# Patient Record
Sex: MALE | Race: WHITE | NOT HISPANIC OR LATINO | Employment: FULL TIME | URBAN - METROPOLITAN AREA
[De-identification: names, ages, dates, MRNs, and addresses within clinical notes are randomized per-mention and may not be internally consistent; named-entity substitution may affect disease eponyms.]

---

## 2017-01-16 ENCOUNTER — OFFICE VISIT (OUTPATIENT)
Dept: ORTHOPEDICS | Facility: CLINIC | Age: 57
End: 2017-01-16
Payer: COMMERCIAL

## 2017-01-16 VITALS — SYSTOLIC BLOOD PRESSURE: 144 MMHG | HEART RATE: 87 BPM | DIASTOLIC BLOOD PRESSURE: 79 MMHG

## 2017-01-16 DIAGNOSIS — M12.812 LEFT ROTATOR CUFF TEAR ARTHROPATHY: Primary | ICD-10-CM

## 2017-01-16 DIAGNOSIS — M50.30 DISC DISEASE, DEGENERATIVE, CERVICAL: ICD-10-CM

## 2017-01-16 DIAGNOSIS — M75.102 LEFT ROTATOR CUFF TEAR ARTHROPATHY: Primary | ICD-10-CM

## 2017-01-16 PROCEDURE — 99213 OFFICE O/P EST LOW 20 MIN: CPT | Mod: 25 | Performed by: PREVENTIVE MEDICINE

## 2017-01-16 PROCEDURE — 20610 DRAIN/INJ JOINT/BURSA W/O US: CPT | Mod: LT | Performed by: PREVENTIVE MEDICINE

## 2017-01-16 RX ORDER — METHYLPREDNISOLONE 4 MG
TABLET, DOSE PACK ORAL
Qty: 21 TABLET | Refills: 0 | Status: SHIPPED | OUTPATIENT
Start: 2017-01-16 | End: 2019-12-05

## 2017-01-16 RX ORDER — MELOXICAM 15 MG/1
15 TABLET ORAL DAILY
Qty: 30 TABLET | Refills: 1 | Status: SHIPPED | OUTPATIENT
Start: 2017-01-16 | End: 2017-03-06

## 2017-01-16 ASSESSMENT — PAIN SCALES - GENERAL: PAINLEVEL: SEVERE PAIN (6)

## 2017-01-16 NOTE — PROGRESS NOTES
HISTORY OF PRESENT ILLNESS  Mr. Valerio is a pleasant 56 year old year old male who presents to clinic today with ongoing neck and left shoulder pain  Previous surgery biceps tenodesis left shoulder with Dr Payton last year  History of one cervical MARIANA which Did not help at all about 7 months ago. Wants to see a surgeon  He had a previous CT of cervical spine showing DDD  Location: neck and left shoulder  Quality:  achy pain/sharp    Severity: 9/10 at worst    Duration: year  Timing: occurs daily  Context: occurs while working (works as a  and   Modifying factors:  resting and non-use makes it better, movement and use makes it worse  Associated signs & symptoms: radiation and numbness into both arms    Additional history: as documented    MEDICAL HISTORY  Patient Active Problem List   Diagnosis     Abnormal glucose     Morbid obesity (H)     Fatigue       Current Outpatient Prescriptions   Medication Sig Dispense Refill     tiZANidine (ZANAFLEX) 4 MG tablet Take 1-2 tablets (4-8 mg) by mouth nightly as needed 30 tablet 1     meloxicam (MOBIC) 15 MG tablet Take 1 tablet (15 mg) by mouth daily 30 tablet 1     oxyCODONE (ROXICODONE) 5 MG immediate release tablet Take 1-2 tablets (5-10 mg) by mouth every 3 hours as needed for pain or other (Moderate to Severe) 80 tablet 0     senna-docusate (SENOKOT-S;PERICOLACE) 8.6-50 MG per tablet Take 1-2 tablets by mouth 2 times daily Take while on oral narcotics to prevent or treat constipation. 30 tablet 0     hydrOXYzine (ATARAX) 10 MG tablet Take 1 tablet (10 mg) by mouth every 6 hours as needed for itching (pain or muscle spasm) 30 tablet 0     VITAMIN D, CHOLECALCIFEROL, PO Take 1,000 Units by mouth daily       ALPRAZolam (XANAX) 1 MG tablet Take 1 tablet (1 mg) by mouth 3 times daily as needed for anxiety 1 tablet 0     venlafaxine (EFFEXOR) 75 MG tablet Take 75 mg by mouth 3 times daily       metFORMIN (GLUCOPHAGE) 500 MG tablet Take 500 mg by mouth  2 times daily (with meals) Hold day of surgery       furosemide (LASIX) 20 MG tablet Take 40 mg by mouth daily       potassium chloride SA (K-DUR,KLOR-CON M) 10 MEQ tablet Take 10 mEq by mouth daily       amLODIPine (NORVASC) 10 MG tablet Take 10 mg by mouth every evening        simvastatin (ZOCOR) 40 MG tablet Take 20 mg by mouth At Bedtime       aspirin 81 MG tablet Take by mouth daily       lisinopril-hydrochlorothiazide (PRINZIDE,ZESTORETIC) 20-25 MG per tablet Take 1 tablet by mouth daily Holding am of surgery       UNABLE TO FIND MEDICATION NAME: Super Enzymes Vitamin       Multiple Vitamins-Minerals (MULTIVITAMIN OR) Take 1 tablet by mouth daily       TESTOSTERONE 2MG Inject as directed every 14 days       Omega-3 Fatty Acids (OMEGA-3 FISH OIL PO) Take 1 g by mouth daily         No Known Allergies    Family History   Problem Relation Age of Onset     DIABETES Mother      Type II     HEART DISEASE Father      Quad bypass       Additional medical/Social/Surgical histories reviewed in [facility] and updated as appropriate.     REVIEW OF SYSTEMS (1/16/2017)  10 point ROS of systems including Constitutional, Eyes, Respiratory, Cardiovascular, Gastroenterology, Genitourinary, Integumentary, Musculoskeletal, Psychiatric were all negative except for pertinent positives noted in my HPI.     PHYSICAL EXAM  Filed Vitals:    01/16/17 1426   BP: 144/79   Pulse: 87     Vital Signs: /79 mmHg  Pulse 87 Patient declined being weighed. There is no weight on file to calculate BMI.    General  - normal appearance, in no obvious distress  CV  - normal radial pulse  Pulm  - normal respiratory pattern, non-labored  Musculoskeletal - left shoulder and neck  - inspection: normal bone and joint alignment, no obvious deformity, no scapular winging, no AC step-off  - palpation: mildly tender RC insertion, normal clavicle, non-tender AC, pain bilateral cervical muscles, upper trap on left  - ROM:  painful and limited flexion and ER at  end range, limited IR of left shoulder, and with neck, limited flexion/extension/side bending and bilateral positive spurlings   - strength: 5/5  strength, 5/5 in all shoulder planes  - special tests:  (-) Speed's  (+) Neer  (+) Hawkin's  (+) Kieran's  (-) [place]'s  (-) apprehension  (-) subscap lift-off  Neuro  - no sensory or motor deficit, grossly normal coordination, normal muscle tone  Skin  - no ecchymosis, erythema, warmth, or induration, no obvious rash  Psych  - interactive, appropriate, normal mood and affect      ASSESSMENT & PLAN  55 yo male presents with left shoulder pain and neck pain due to DDD  -given left shoulder inection, will followup with Dr Payton who performed his previous biceps tenodesis  -referral to Dr Sanchez for cervical dDD options  -rx for medrol, tizanadine, mobic PRN  -RTC in a few weeks after seeing referral Rosalind Cottrell MD, CAQSM    PROCEDURE: SHOULDER GH joint injection   NDC 2381-2067-52 kenalog     The patient was apprised of the risks and the benefits of the procedure and consented and a written consent was signed by the patient.   The patient s shoulder was sterilely prepped with Betadine.   40 mg of triamcinolone suspension was drawn up into a 5 mL syringe with 2 mL of 1% lidocaine   The patient was injected with a 1.5-inch 25-gauge needle at posterior gleno-humeral joint in the subacromial space  There were no complications. The patient tolerated the procedure well. There was minimal bleeding.   The patient was instructed to ice the shoulder upon leaving clinic and refrain from overuse over the next 3 days.   The patient was instructed to go to the emergency room with any usual pain, swelling, or redness occurred in the injected area.   The patient was given a followup appointment to evaluate response to the injection to their increased range of motion and reduction of pain.    followup PRN  Dr Chace Cottrell

## 2017-01-19 ENCOUNTER — OFFICE VISIT (OUTPATIENT)
Dept: ORTHOPEDICS | Facility: CLINIC | Age: 57
End: 2017-01-19
Payer: COMMERCIAL

## 2017-01-19 DIAGNOSIS — M54.10 RADICULAR PAIN: Primary | ICD-10-CM

## 2017-01-19 PROCEDURE — 99213 OFFICE O/P EST LOW 20 MIN: CPT | Performed by: ORTHOPAEDIC SURGERY

## 2017-01-19 NOTE — NURSING NOTE
"Osbaldo Valerio's goals for this visit include:   Chief Complaint   Patient presents with     Consult     new right and left shoulder per Dr. Cottrell       He requests these members of his care team be copied on today's visit information: pcp    PCP: Mick Varela    Referring Provider:  No referring provider defined for this encounter.    Chief Complaint   Patient presents with     Consult     new right and left shoulder per Dr. Cottrell       Initial There were no vitals taken for this visit. Estimated body mass index is 39.36 kg/(m^2) as calculated from the following:    Height as of 3/8/16: 1.803 m (5' 11\").    Weight as of 3/17/16: 127.96 kg (282 lb 1.6 oz).  BP completed using cuff size: large    Do you need any medication refills at today's visit? No    Yesenia Heath CMA ([facility])      "

## 2017-01-24 ENCOUNTER — TRANSFERRED RECORDS (OUTPATIENT)
Dept: HEALTH INFORMATION MANAGEMENT | Facility: CLINIC | Age: 57
End: 2017-01-24

## 2017-03-09 DIAGNOSIS — M75.102 LEFT ROTATOR CUFF TEAR ARTHROPATHY: Primary | ICD-10-CM

## 2017-03-09 DIAGNOSIS — M12.812 LEFT ROTATOR CUFF TEAR ARTHROPATHY: Primary | ICD-10-CM

## 2017-03-09 RX ORDER — DICLOFENAC SODIUM 75 MG/1
75 TABLET, DELAYED RELEASE ORAL 2 TIMES DAILY PRN
Qty: 40 TABLET | Refills: 1 | Status: SHIPPED | OUTPATIENT
Start: 2017-03-09 | End: 2017-04-26

## 2017-04-17 ENCOUNTER — OFFICE VISIT (OUTPATIENT)
Dept: ORTHOPEDICS | Facility: CLINIC | Age: 57
End: 2017-04-17
Payer: COMMERCIAL

## 2017-04-17 ENCOUNTER — RADIANT APPOINTMENT (OUTPATIENT)
Dept: GENERAL RADIOLOGY | Facility: CLINIC | Age: 57
End: 2017-04-17
Attending: PREVENTIVE MEDICINE
Payer: COMMERCIAL

## 2017-04-17 VITALS — HEART RATE: 81 BPM | DIASTOLIC BLOOD PRESSURE: 73 MMHG | SYSTOLIC BLOOD PRESSURE: 138 MMHG

## 2017-04-17 DIAGNOSIS — M54.5 LOW BACK PAIN, UNSPECIFIED BACK PAIN LATERALITY, UNSPECIFIED CHRONICITY, WITH SCIATICA PRESENCE UNSPECIFIED: Primary | ICD-10-CM

## 2017-04-17 DIAGNOSIS — M54.5 LOW BACK PAIN, UNSPECIFIED BACK PAIN LATERALITY, UNSPECIFIED CHRONICITY, WITH SCIATICA PRESENCE UNSPECIFIED: ICD-10-CM

## 2017-04-17 DIAGNOSIS — M51.16 LUMBAR DISC HERNIATION WITH RADICULOPATHY: ICD-10-CM

## 2017-04-17 DIAGNOSIS — Z98.890 HISTORY OF LUMBOSACRAL SPINE SURGERY: ICD-10-CM

## 2017-04-17 DIAGNOSIS — M12.812 LEFT ROTATOR CUFF TEAR ARTHROPATHY: ICD-10-CM

## 2017-04-17 DIAGNOSIS — M75.102 LEFT ROTATOR CUFF TEAR ARTHROPATHY: ICD-10-CM

## 2017-04-17 PROCEDURE — 99214 OFFICE O/P EST MOD 30 MIN: CPT | Performed by: PREVENTIVE MEDICINE

## 2017-04-17 PROCEDURE — 72100 X-RAY EXAM L-S SPINE 2/3 VWS: CPT | Performed by: RADIOLOGY

## 2017-04-17 ASSESSMENT — PAIN SCALES - GENERAL: PAINLEVEL: SEVERE PAIN (6)

## 2017-04-17 NOTE — PROGRESS NOTES
HISTORY OF PRESENT ILLNESS  Mr. Valerio is a pleasant 56 year old year old male who presents to clinic today with right hip pain  Osbaldo explains that he has had some more pain over the past week. Has history of lumbar fusion surgery in 2001.    Location: right hip  Quality:  achy pain    Severity: 7/10 at worst    Duration: a few weeks  Timing: occurs intermittently towards the end of his day  Context: occurs while standing and walking a lot, and at night when lies flat  Modifying factors:  resting and non-use makes it better, movement and use makes it worse  Associated signs & symptoms: radiation of pain into right leg and tingling  Previous similar pain: yes  Works as a hog farmer  Additional history: as documented    MEDICAL HISTORY  Patient Active Problem List   Diagnosis     Abnormal glucose     Morbid obesity (H)     Fatigue       Current Outpatient Prescriptions   Medication Sig Dispense Refill     diclofenac (VOLTAREN) 75 MG EC tablet Take 1 tablet (75 mg) by mouth 2 times daily as needed 40 tablet 1     meloxicam (MOBIC) 15 MG tablet TAKE ONE TABLET BY MOUTH ONCE DAILY 30 tablet 1     tiZANidine (ZANAFLEX) 4 MG tablet Take 1-2 tablets (4-8 mg) by mouth nightly as needed 30 tablet 1     methylPREDNISolone (MEDROL DOSEPAK) 4 MG tablet Follow package instructions 21 tablet 0     oxyCODONE (ROXICODONE) 5 MG immediate release tablet Take 1-2 tablets (5-10 mg) by mouth every 3 hours as needed for pain or other (Moderate to Severe) 80 tablet 0     senna-docusate (SENOKOT-S;PERICOLACE) 8.6-50 MG per tablet Take 1-2 tablets by mouth 2 times daily Take while on oral narcotics to prevent or treat constipation. 30 tablet 0     hydrOXYzine (ATARAX) 10 MG tablet Take 1 tablet (10 mg) by mouth every 6 hours as needed for itching (pain or muscle spasm) 30 tablet 0     VITAMIN D, CHOLECALCIFEROL, PO Take 1,000 Units by mouth daily       ALPRAZolam (XANAX) 1 MG tablet Take 1 tablet (1 mg) by mouth 3 times daily as needed  for anxiety 1 tablet 0     venlafaxine (EFFEXOR) 75 MG tablet Take 75 mg by mouth 3 times daily       metFORMIN (GLUCOPHAGE) 500 MG tablet Take 500 mg by mouth 2 times daily (with meals) Hold day of surgery       furosemide (LASIX) 20 MG tablet Take 40 mg by mouth daily       potassium chloride SA (K-DUR,KLOR-CON M) 10 MEQ tablet Take 10 mEq by mouth daily       amLODIPine (NORVASC) 10 MG tablet Take 10 mg by mouth every evening        simvastatin (ZOCOR) 40 MG tablet Take 20 mg by mouth At Bedtime       aspirin 81 MG tablet Take by mouth daily       lisinopril-hydrochlorothiazide (PRINZIDE,ZESTORETIC) 20-25 MG per tablet Take 1 tablet by mouth daily Holding am of surgery       UNABLE TO FIND MEDICATION NAME: Super Enzymes Vitamin       Multiple Vitamins-Minerals (MULTIVITAMIN OR) Take 1 tablet by mouth daily       TESTOSTERONE 2MG Inject as directed every 14 days       Omega-3 Fatty Acids (OMEGA-3 FISH OIL PO) Take 1 g by mouth daily         No Known Allergies    Family History   Problem Relation Age of Onset     DIABETES Mother      Type II     HEART DISEASE Father      Quad bypass       Additional medical/Social/Surgical histories reviewed in [facility] and updated as appropriate.     REVIEW OF SYSTEMS (4/17/2017)  10 point ROS of systems including Constitutional, Eyes, Respiratory, Cardiovascular, Gastroenterology, Genitourinary, Integumentary, Musculoskeletal, Psychiatric were all negative except for pertinent positives noted in my HPI.     Vital Signs: /73  Pulse 81 Patient declined being weighed. There is no height or weight on file to calculate BMI.    General  - normal appearance, in no obvious distress, obese  CV  - normal peripheral perfusion  Pulm  - normal respiratory pattern, non-labored  Musculoskeletal - lumbar spine and right hip  - stance: normal gait without limp, no obvious leg length discrepancy, normal heel and toe walk  Has no pain in right hip with internal or external rotation  -  inspection: normal bone and joint alignment, no obvious scoliosis  - palpation: no paravertebral or bony tenderness  - ROM: flexion exacerbates pain in low back and right hip, abnormal extension- stiff and sore, sidebending, rotation  - strength: lower extremities 5/5 in all planes  - special tests:  (+) straight leg raise- right  (+) slump test-right  Neuro  - patellar and Achilles DTRs 2+ bilaterally, right lower extremity sensory deficit throughout L3 distribution, grossly normal coordination, normal muscle tone  Skin  - no ecchymosis, erythema, warmth, or induration, no obvious rash  Psych  - interactive, appropriate, normal mood and affect  ASSESSMENT & PLAN  57 yo male with right hip and lumbar pain due to lumbar herniated disc  -xrays reviewed: has disc space narrowing at L3 above the fusion  Ordered a lumbar CT scan for possible injection MARIANA ordered  F/u after CT (OK TO CALL FOR RESULTS WITH PLAN FOR MARIANA)    Chace Cottrell MD, CAQSM

## 2017-04-17 NOTE — MR AVS SNAPSHOT
After Visit Summary   4/17/2017    Osbaldo Valerio    MRN: [id number]           Patient Information     Date Of Birth          1960        Visit Information        Provider Department      4/17/2017 3:20 PM Chace Cottrell MD [facility]        Today's Diagnoses     Low back pain, unspecified back pain laterality, unspecified chronicity, with sciatica presence unspecified    -  1    Lumbar disc herniation with radiculopathy        History of lumbosacral spine surgery        Left rotator cuff tear arthropathy          Care Instructions    Thanks for coming today.  Ortho/Sports Medicine Clinic  [address] [place] [address]    To schedule future appointments in Ortho Clinic, you may call [phone].    To schedule ordered imaging by your Provider: Call [facility] at [phone]    [employer] available online at:   [email or web address]    Please call if any further questions or concerns [phone] and ask for the Orthopedic Department. Clinic hours 8 am to 5 pm.    Return to clinic if symptoms worsen.        Follow-ups after your visit        Your next 10 appointments already scheduled     Apr 17, 2017  4:35 PM CDT   XR LUMBAR SPINE 2/3 VIEWS with MGXR2   [facility])    [address] [facility] [address] [phone]           Please bring a list of your current medicines to your exam. (Include vitamins, minerals and over-thecounter medicines.) Leave your valuables at home.  Tell your doctor if there is a chance you may be pregnant.  You do not need to do anything special for this exam.            Apr 18, 2017  2:30 PM CDT   CT LUMBAR SPINE W/O CONTRAST with MGCT1   [facility] ([facility])    [address] [facility] [address] [phone]           Please bring any scans or X-rays taken at other hospitals, if similar tests were  done. Also bring a list of your medicines, including vitamins, minerals and over-the-counter drugs. It is safest to leave personal items at home.  Be sure to tell your doctor:   If you have any allergies.   If there s any chance you are pregnant.   If you are breastfeeding.   If you have any special needs.  You do not need to do anything special to prepare.  Please wear loose clothing, such as a sweat suit or jogging clothes. Avoid snaps, zippers and other metal. We may ask you to undress and put on a hospital gown.              Future tests that were ordered for you today     Open Future Orders        Priority Expected Expires Ordered    CT Lumbar Spine w/o Contrast Routine  4/17/2018 4/17/2017            Who to contact     If you have questions or need follow up information about today's clinic visit or your schedule please contact [facility] directly at [phone].  Normal or non-critical lab and imaging results will be communicated to you by [employer], letter or phone within 4 business days after the clinic has received the results. If you do not hear from us within 7 days, please contact the clinic through [employer] or phone. If you have a critical or abnormal lab result, we will notify you by phone as soon as possible.  Submit refill requests through [email or web address] or call your pharmacy and they will forward the refill request to us. Please allow 3 business days for your refill to be completed.          Additional Information About Your Visit        [email or web address] Information     [email or web address] is an electronic gateway that provides easy, online access to your medical records. With [email or web address], you can request a clinic appointment, read your test results, renew a prescription or communicate with your care team.     To sign up for [email or web address] visit the website at [email or web address]   You will be asked to enter the access code listed below, as well as some personal information. Please follow the directions to  create your username and password.     Your access code is: [email or web address]  Expires: 2017  4:02 PM     Your access code will  in 90 days. If you need help or a new code, please contact your [facility] or call [phone] for assistance.        Care EveryWhere ID     This is your Care EveryWhere ID. This could be used by other organizations to access your [place] medical records  [id number]        Your Vitals Were     Pulse                   81            Blood Pressure from Last 3 Encounters:   17 138/73   17 144/79   16 130/77    Weight from Last 3 Encounters:   16 128 kg (282 lb 1.6 oz)   16 121 kg (266 lb 11.2 oz)   06/15/15 117.9 kg (260 lb 0.2 oz)                 Where to get your medicines      These medications were sent to [id number] - [place], [place] - [address]  [address] [facility] [address]    Hours:  test Rx sent successfully 02  KR Phone:  [phone]     tiZANidine 4 MG tablet          Primary Care Provider Office Phone # Fax #    Mick Varela PA-C [phone]       [facility] [address]  [facility] [address]        Thank you!     Thank you for choosing [facility]  for your care. Our goal is always to provide you with excellent care. Hearing back from our patients is one way we can continue to improve our services. Please take a few minutes to complete the written survey that you may receive in the mail after your visit with us. Thank you!             Your Updated Medication List - Protect others around you: Learn how to safely use, store and throw away your medicines at www.disposemymeds.org.          This list is accurate as of: 17  4:04 PM.  Always use your most recent med list.                   Brand Name Dispense Instructions for use    ALPRAZolam 1 MG tablet    XANAX    1 tablet    Take 1 tablet (1 mg) by mouth 3 times daily as  needed for anxiety       amLODIPine 10 MG tablet    NORVASC     Take 10 mg by mouth every evening       aspirin 81 MG tablet      Take by mouth daily       diclofenac 75 MG EC tablet    VOLTAREN    40 tablet    Take 1 tablet (75 mg) by mouth 2 times daily as needed       furosemide 20 MG tablet    LASIX     Take 40 mg by mouth daily       hydrOXYzine 10 MG tablet    ATARAX    30 tablet    Take 1 tablet (10 mg) by mouth every 6 hours as needed for itching (pain or muscle spasm)       lisinopril-hydrochlorothiazide 20-25 MG per tablet    PRINZIDE/ZESTORETIC     Take 1 tablet by mouth daily Holding am of surgery       meloxicam 15 MG tablet    MOBIC    30 tablet    TAKE ONE TABLET BY MOUTH ONCE DAILY       metFORMIN 500 MG tablet    GLUCOPHAGE     Take 500 mg by mouth 2 times daily (with meals) Hold day of surgery       methylPREDNISolone 4 MG tablet    MEDROL DOSEPAK    21 tablet    Follow package instructions       MULTIVITAMIN PO      Take 1 tablet by mouth daily       OMEGA-3 FISH OIL PO      Take 1 g by mouth daily       oxyCODONE 5 MG IR tablet    ROXICODONE    80 tablet    Take 1-2 tablets (5-10 mg) by mouth every 3 hours as needed for pain or other (Moderate to Severe)       potassium chloride SA 10 MEQ CR tablet    K-DUR/KLOR-CON M     Take 10 mEq by mouth daily       senna-docusate 8.6-50 MG per tablet    SENOKOT-S;PERICOLACE    30 tablet    Take 1-2 tablets by mouth 2 times daily Take while on oral narcotics to prevent or treat constipation.       simvastatin 40 MG tablet    ZOCOR     Take 20 mg by mouth At Bedtime       TESTOSTERONE 2MG      Inject as directed every 14 days       tiZANidine 4 MG tablet    ZANAFLEX    30 tablet    Take 1-2 tablets (4-8 mg) by mouth nightly as needed       UNABLE TO FIND      MEDICATION NAME: Super Enzymes Vitamin       venlafaxine 75 MG tablet    EFFEXOR     Take 75 mg by mouth 3 times daily       VITAMIN D (CHOLECALCIFEROL) PO      Take 1,000 Units by mouth daily

## 2017-04-17 NOTE — NURSING NOTE
"Osbaldo Valerio's goals for this visit include: Right hip evaluation.   He requests these members of his care team be copied on today's visit information: no    PCP: Mick Varela    Referring Provider:  ESTABLISHED PATIENT  No address on file    Chief Complaint   Patient presents with     Musculoskeletal Problem     Right hip pain which has been present for a couple of weeks.        Initial /73  Pulse 81 Estimated body mass index is 39.34 kg/(m^2) as calculated from the following:    Height as of 3/8/16: 1.803 m (5' 11\").    Weight as of 3/17/16: 128 kg (282 lb 1.6 oz).  Medication Reconciliation: complete  "

## 2017-04-17 NOTE — PATIENT INSTRUCTIONS
Thanks for coming today.  Ortho/Sports Medicine Clinic  [address] [place] [address]    To schedule future appointments in Ortho Clinic, you may call [phone].    To schedule ordered imaging by your Provider: Call [facility] at [phone]    [employer] available online at:   [email or web address]    Please call if any further questions or concerns [phone] and ask for the Orthopedic Department. Clinic hours 8 am to 5 pm.    Return to clinic if symptoms worsen.

## 2017-04-18 ENCOUNTER — RADIANT APPOINTMENT (OUTPATIENT)
Dept: CT IMAGING | Facility: CLINIC | Age: 57
End: 2017-04-18
Attending: PREVENTIVE MEDICINE
Payer: COMMERCIAL

## 2017-04-18 DIAGNOSIS — Z98.890 HISTORY OF LUMBOSACRAL SPINE SURGERY: ICD-10-CM

## 2017-04-18 DIAGNOSIS — M51.16 LUMBAR DISC HERNIATION WITH RADICULOPATHY: ICD-10-CM

## 2017-04-18 DIAGNOSIS — M54.5 LOW BACK PAIN, UNSPECIFIED BACK PAIN LATERALITY, UNSPECIFIED CHRONICITY, WITH SCIATICA PRESENCE UNSPECIFIED: ICD-10-CM

## 2017-04-18 PROCEDURE — 72131 CT LUMBAR SPINE W/O DYE: CPT | Performed by: RADIOLOGY

## 2017-04-24 ENCOUNTER — TRANSFERRED RECORDS (OUTPATIENT)
Dept: HEALTH INFORMATION MANAGEMENT | Facility: CLINIC | Age: 57
End: 2017-04-24

## 2017-04-26 DIAGNOSIS — M75.102 LEFT ROTATOR CUFF TEAR ARTHROPATHY: ICD-10-CM

## 2017-04-26 DIAGNOSIS — M12.812 LEFT ROTATOR CUFF TEAR ARTHROPATHY: ICD-10-CM

## 2017-04-26 NOTE — TELEPHONE ENCOUNTER
Pending Prescriptions:                       Disp   Refills    diclofenac (VOLTAREN) 75 MG EC tablet     40 tab*1            Sig: Take 1 tablet (75 mg) by mouth 2 times daily as           needed    Faxed refill request from: [facility]  Medication requested: Dicofenac Sodium 75mg TBEC 75  Prescription Written: Take one tablet by mouth twice a day as needed.   Last filled: 03/31/2017  Last qty: 40  Pt's last office visit: 04/20/17  Next scheduled office visit: None      Message routed to Dr. Cottrell for review

## 2017-04-27 ENCOUNTER — TELEPHONE (OUTPATIENT)
Dept: PEDIATRICS | Facility: CLINIC | Age: 57
End: 2017-04-27

## 2017-04-27 RX ORDER — DICLOFENAC SODIUM 75 MG/1
75 TABLET, DELAYED RELEASE ORAL 2 TIMES DAILY PRN
Qty: 40 TABLET | Refills: 1 | Status: SHIPPED | OUTPATIENT
Start: 2017-04-27 | End: 2017-06-06

## 2017-04-27 NOTE — TELEPHONE ENCOUNTER
[facility] Call Center    Phone Message    Name of Caller: Jeannie    Phone Number: Cell number on file:    Telephone Information:   Mobile [phone]       Best time to return call: Any    May a detailed message be left on voicemail: yes    Relation to patient: Other Name: Concha  Relationship: wife      Reason for Call: Other: Patient is calling to discuss the next step after the injection that was given. Patient received an injection at [facility] and it is not helping. Jeannie is calling to discuss the next step. Please advise.      Action Taken: Message routed to:  Adult Clinics: Sports Medicine p [phone]

## 2017-04-27 NOTE — TELEPHONE ENCOUNTER
Called and spoke with Osbaldo. He states that he is still having the back and leg pain. Asked when the injection was done and he said three days ago. Let him know that the Cortisone injections can take up to two weeks for them to really be effective. Just to be sure to keep icing and taking it easy the best he can so the meds can do the job.     Osbaldo said that he will wait another week and call to follow up with symptoms if they have not improved.

## 2017-05-18 ENCOUNTER — TELEPHONE (OUTPATIENT)
Dept: ORTHOPEDICS | Facility: CLINIC | Age: 57
End: 2017-05-18

## 2017-05-18 DIAGNOSIS — Z98.890 HISTORY OF LUMBAR SURGERY: ICD-10-CM

## 2017-05-18 DIAGNOSIS — M51.369 DDD (DEGENERATIVE DISC DISEASE), LUMBAR: Primary | ICD-10-CM

## 2017-05-18 NOTE — TELEPHONE ENCOUNTER
[facility] Call Center    Phone Message    Name of Caller: self    Phone Number: Cell number on file:    Telephone Information:   Mobile [phone]       Best time to return call: soon    May a detailed message be left on voicemail: no    Relation to patient: Self    Reason for Call: Other: Pt is calling requesting a call back from dr. andrew or care team regarding what sheould the next step should be in the care plan as far as seeing a spine surgeon. Pleae give a call back to discuss     Action Taken: Message routed to:  Adult Clinics: Sports Medicine p [phone]

## 2017-05-18 NOTE — TELEPHONE ENCOUNTER
Returned call to Osbaldo. He is reporting no change in symptoms and that he got not relief from recent Epidural injection. He talks about spine surgery and would like to know if this is the appropriate next step. He does have a history of spine surgery from 2002 in [facility]. He reports that he is having a tough time sleeping and has pain still. I let him know that I will talk with Dr. Cottrell and get back to him with recommendations.   All questions answered.

## 2017-05-19 ENCOUNTER — TELEPHONE (OUTPATIENT)
Dept: ORTHOPEDICS | Facility: CLINIC | Age: 57
End: 2017-05-19

## 2017-05-19 DIAGNOSIS — M51.369 DEGENERATIVE DISC DISEASE, LUMBAR: Primary | ICD-10-CM

## 2017-05-19 NOTE — TELEPHONE ENCOUNTER
[facility] Call Center    Phone Message    Name of Caller: Jeannie    Phone Number: Other phone number:  [phone]*    Best time to return call: ANY    May a detailed message be left on voicemail: yes    Relation to patient: Spouse    Reason for Call: Other: Calling back about Spine surgeon.      Action Taken: Message routed to:  Adult Clinics: Sports Medicine p [id number]

## 2017-05-22 DIAGNOSIS — M54.10 RADICULAR PAIN: Primary | ICD-10-CM

## 2017-05-22 NOTE — TELEPHONE ENCOUNTER
Patients spouse is calling back to follow up on call back request. Jeannie states that they are waiting to hear what the next steps will be for Osbaldo. Please advise,

## 2017-05-22 NOTE — TELEPHONE ENCOUNTER
Spoke to wife, gave number to schedule with Dr Mayers at [facility]  Refilled tizanadine.

## 2017-05-24 ENCOUNTER — PRE VISIT (OUTPATIENT)
Dept: ORTHOPEDICS | Facility: CLINIC | Age: 57
End: 2017-05-24

## 2017-05-24 NOTE — TELEPHONE ENCOUNTER
1.  Date/reason for appt: 9/12/17 9AM - Degenerative Disc Disease   2.  Referring provider: Dr. Cottrell  3.  Call to patient (Yes / No - short description): Yes, pt's wife transferred from Call Center  4.  Previous care at / records requested from:   - [facility] (Dr. Cottrell) -- Records and imaging in [employer]/pacs   - [facility] -- Pt seen with Dr. Brown Almanzar approx 15 years ago -- NEED [facility]   - [facility] -- Spinal fusion done here approx 15 years ago with Dr. Brown Almanzar -- NEED [facility]   - [facility] -- CT C Spine 6/6/16 radiology report and imaging in Epic/ Pacs. Pt completed recent imaging in May 2017, will contact [facility] to push imaging and fax report.    ** Per pt's spouse, pt was NOT seen at [facility].

## 2017-06-06 DIAGNOSIS — M12.812 LEFT ROTATOR CUFF TEAR ARTHROPATHY: ICD-10-CM

## 2017-06-06 DIAGNOSIS — M75.102 LEFT ROTATOR CUFF TEAR ARTHROPATHY: ICD-10-CM

## 2017-06-06 RX ORDER — DICLOFENAC SODIUM 75 MG/1
75 TABLET, DELAYED RELEASE ORAL 2 TIMES DAILY PRN
Qty: 40 TABLET | Refills: 1 | Status: SHIPPED | OUTPATIENT
Start: 2017-06-06 | End: 2019-12-05

## 2017-06-08 ENCOUNTER — TELEPHONE (OUTPATIENT)
Dept: ORTHOPEDICS | Facility: CLINIC | Age: 57
End: 2017-06-08

## 2017-06-08 NOTE — TELEPHONE ENCOUNTER
[facility] Call Center    Phone Message    Name of Caller: Jeannie (Wife)    Phone Number: Cell number on file:    Telephone Information:   Mobile [phone]       Best time to return call: SOON    May a detailed message be left on voicemail: yes    Relation to patient: Other Name: Jeannie Valerio  Relationship: Wife  Is there legal documentation in chart to discuss information with this person: Yes. Legal Documentation is verified by info in chart     Reason for Call: Other: Patient is having some discomfort and is having a hard time sleeping and would like to dicuss the medication that Dr. Cottrell was going to prescribe to him.     Action Taken: Message routed to:  Adult Clinics: Orthopedics p [id number]

## 2017-06-08 NOTE — TELEPHONE ENCOUNTER
Pt. Wife called back and would like someone to call, because pt needs something to help manage pain. She would like a call back as soon as possible

## 2017-06-09 ENCOUNTER — TELEPHONE (OUTPATIENT)
Dept: ORTHOPEDICS | Facility: CLINIC | Age: 57
End: 2017-06-09

## 2017-06-09 DIAGNOSIS — M54.16 LUMBAR RADICULAR PAIN: Primary | ICD-10-CM

## 2017-06-09 RX ORDER — METHYLPREDNISOLONE 4 MG
TABLET, DOSE PACK ORAL
Qty: 21 TABLET | Refills: 0 | Status: SHIPPED | OUTPATIENT
Start: 2017-06-09 | End: 2019-12-05

## 2017-06-09 RX ORDER — GABAPENTIN 300 MG/1
CAPSULE ORAL
Qty: 90 CAPSULE | Refills: 0 | Status: SHIPPED | OUTPATIENT
Start: 2017-06-09 | End: 2019-12-05

## 2017-06-09 RX ORDER — TRAMADOL HYDROCHLORIDE 50 MG/1
50-100 TABLET ORAL EVERY 6 HOURS PRN
Qty: 20 TABLET | Refills: 0 | Status: SHIPPED | OUTPATIENT
Start: 2017-06-09 | End: 2019-12-05

## 2017-06-09 NOTE — PROGRESS NOTES
Spoke to patient, called in gabapentin, medrol pack, and tramadol PRN  Has plans to see spine surgery next week.   Dr Cottrell

## 2017-06-09 NOTE — TELEPHONE ENCOUNTER
Licha Crowe  Signed    Date of Service: 06/09/2017 4:30 PM Creation Time: 06/09/2017 4:30 PM    Addend Delete  Bookmark Copy        [facility] Call Center     Phone Message     Name of Caller: RAMIRO ARRIETA     Phone Number: Cell number on file:        Telephone Information:   Mobile [phone]         Best time to return call: NOW. Pt at [id number] - [place], [place] - [address]        May a detailed message be left on voicemail: no     Relation to patient: Self     Reason for Call: Other: Please call.  Pt is at pharmacy waiting. Wants something for pain today.       Action Taken: Message routed to:  Adult Clinics: Sports Medicine p [id number]

## 2017-06-09 NOTE — TELEPHONE ENCOUNTER
"Returned call to patient. Discussed his pain level and that he states he is unable to sleep. He reports that he has been taking 800MG Ibuprofen and that he \"tried one of his son's Percocet\" and that helped a little. He is requesting something ASAP. I told him that I will speak with Dr. Cottrell and get back to him today. Pharmacy is [facility] in [place].   "

## 2017-06-13 ENCOUNTER — TELEPHONE (OUTPATIENT)
Dept: ORTHOPEDICS | Facility: CLINIC | Age: 57
End: 2017-06-13

## 2017-06-13 NOTE — TELEPHONE ENCOUNTER
Writer spoke with pts wife and was informed pt has decided to pursue spine care with Surgeon at [facility] who had previously performed his spine surgery in 2005.

## 2019-11-07 DIAGNOSIS — M25.512 BILATERAL SHOULDER PAIN: Primary | ICD-10-CM

## 2019-11-07 DIAGNOSIS — M25.511 BILATERAL SHOULDER PAIN: Primary | ICD-10-CM

## 2019-11-07 NOTE — PROGRESS NOTES
Pt is actually a return Pt of Dr. Payton's being seen for bilateral shoulder pain. Will need new XR or bilat shoulder. Called and left VM to discuss, also needed to move appt later by 75 minutes, but Pt still needs to arrive 30 min early (arrive at 3:15-3:30p). XR orders placed and scheduled per standing orders.    Gerard Cates RN

## 2019-11-21 ENCOUNTER — OFFICE VISIT (OUTPATIENT)
Dept: ORTHOPEDICS | Facility: CLINIC | Age: 59
End: 2019-11-21
Payer: COMMERCIAL

## 2019-11-21 ENCOUNTER — ANCILLARY PROCEDURE (OUTPATIENT)
Dept: GENERAL RADIOLOGY | Facility: CLINIC | Age: 59
End: 2019-11-21
Attending: ORTHOPAEDIC SURGERY
Payer: COMMERCIAL

## 2019-11-21 VITALS
BODY MASS INDEX: 38.5 KG/M2 | WEIGHT: 275 LBS | DIASTOLIC BLOOD PRESSURE: 73 MMHG | HEART RATE: 80 BPM | HEIGHT: 71 IN | SYSTOLIC BLOOD PRESSURE: 139 MMHG | OXYGEN SATURATION: 91 %

## 2019-11-21 DIAGNOSIS — M25.511 BILATERAL SHOULDER PAIN, UNSPECIFIED CHRONICITY: Primary | ICD-10-CM

## 2019-11-21 DIAGNOSIS — M25.512 BILATERAL SHOULDER PAIN, UNSPECIFIED CHRONICITY: Primary | ICD-10-CM

## 2019-11-21 DIAGNOSIS — M25.512 BILATERAL SHOULDER PAIN: ICD-10-CM

## 2019-11-21 DIAGNOSIS — M25.511 BILATERAL SHOULDER PAIN: ICD-10-CM

## 2019-11-21 PROCEDURE — 99213 OFFICE O/P EST LOW 20 MIN: CPT | Performed by: ORTHOPAEDIC SURGERY

## 2019-11-21 PROCEDURE — 73030 X-RAY EXAM OF SHOULDER: CPT | Mod: RT | Performed by: RADIOLOGY

## 2019-11-21 ASSESSMENT — MIFFLIN-ST. JEOR: SCORE: 2099.52

## 2019-11-21 NOTE — NURSING NOTE
"Osbaldo Valerio's chief complaint for this visit includes:  Chief Complaint   Patient presents with     Left Shoulder - Pain     S/p left shoulder diagnostic arthroscopy and open biceps tenodesis, sx: 3/8/2016     Right Shoulder - Pain     PCP: Mick Varela    Referring Provider:  No referring provider defined for this encounter.    /73 (BP Location: Left arm, Patient Position: Sitting, Cuff Size: Adult Regular)   Pulse 80   Ht 1.803 m (5' 11\")   Wt 124.7 kg (275 lb)   SpO2 91%   BMI 38.35 kg/m    Data Unavailable     Do you need any medication refills at today's visit? No    Right hand dominant    Sandhya Patel CMA        "

## 2019-11-21 NOTE — LETTER
"    11/21/2019         RE: Osbaldo Valerio  [address] [facility] [address]        Dear Colleague,    Thank you for referring your patient, Osbaldo Valerio, to the [facility]. Please see a copy of my visit note below.    CHIEF CONCERN:  Bilateral Shoulder Pain    HISTORY OF PRESENT ILLNESS:  Osbaldo is a pleasant 58 year old right hand dominant man who returns to see me to discuss bilateral shoulder pain.  He was previously known to me from a previous surgery (Left shoulder scope with open biceps tenodesis 3/8/2016).  He has now been having pain in both shoulders (R>L) about 4 months ago.  He works as a  and thinks that all of the heavy lifting and repetitive motions of his job contributes to this pain.  Pain at it's worst is a 7-8 on a scale of 1-10.  He relates that pain the worst when lifting above his head and when moving weight with arms outstretched.  He is feeling pain the most in his clavicle and biceps. His pain was quite bad 2 months ago but has been better this month.  Of note, his pain bilaterally is more over the midshaft clavicle/trap/anterior neck.      Past Medical History:   Diagnosis Date     Anxiety      Diabetes (H)     Type II     Hyperlipidaemia      Hypertension      Irregular heart beat     After spine surgery, \"my heart was beating so fast and irregular they brought me to another floor, \" He is thinking it was [facility].     Sleep apnea     Bipap     Testosterone deficiency        Past Surgical History:   Procedure Laterality Date     ARTHROSCOPY SHOULDER ROTATOR CUFF REPAIR Left 3/8/2016    Procedure: ARTHROSCOPY SHOULDER ROTATOR CUFF REPAIR;  Surgeon: Chayito Payton MD;  Location:  OR     ARTHROSCOPY SHOULDER, OPEN BICEP TENODESIS REPAIR, COMBINED Left 3/8/2016    Procedure: COMBINED ARTHROSCOPY SHOULDER, OPEN BICEP TENODESIS REPAIR;  Surgeon: Chayito Payton MD;  Location:  OR     BACK SURGERY  2002    Fusion     ENT " SURGERY      T & A     SEPTOPLASTY       THORACIC SURGERY         Current Outpatient Medications   Medication Sig Dispense Refill     ALPRAZolam (XANAX) 1 MG tablet Take 1 tablet (1 mg) by mouth 3 times daily as needed for anxiety 1 tablet 0     amLODIPine (NORVASC) 10 MG tablet Take 10 mg by mouth every evening        aspirin 81 MG tablet Take by mouth daily       diclofenac (VOLTAREN) 75 MG EC tablet Take 1 tablet (75 mg) by mouth 2 times daily as needed 40 tablet 1     furosemide (LASIX) 20 MG tablet Take 40 mg by mouth daily       gabapentin (NEURONTIN) 300 MG capsule Take 1 tablet (300 mg) every night for 1-3 days, then 1 tablet twice daily for 1-3 days, then 1 tablet three times daily 90 capsule 0     hydrOXYzine (ATARAX) 10 MG tablet Take 1 tablet (10 mg) by mouth every 6 hours as needed for itching (pain or muscle spasm) 30 tablet 0     lisinopril-hydrochlorothiazide (PRINZIDE,ZESTORETIC) 20-25 MG per tablet Take 1 tablet by mouth daily Holding am of surgery       meloxicam (MOBIC) 15 MG tablet TAKE ONE TABLET BY MOUTH ONCE DAILY 30 tablet 1     metFORMIN (GLUCOPHAGE) 500 MG tablet Take 500 mg by mouth 2 times daily (with meals) Hold day of surgery       methylPREDNISolone (MEDROL DOSEPAK) 4 MG tablet Follow package instructions 21 tablet 0     methylPREDNISolone (MEDROL DOSEPAK) 4 MG tablet Follow package instructions 21 tablet 0     Multiple Vitamins-Minerals (MULTIVITAMIN OR) Take 1 tablet by mouth daily       Omega-3 Fatty Acids (OMEGA-3 FISH OIL PO) Take 1 g by mouth daily       oxyCODONE (ROXICODONE) 5 MG immediate release tablet Take 1-2 tablets (5-10 mg) by mouth every 3 hours as needed for pain or other (Moderate to Severe) 80 tablet 0     potassium chloride SA (K-DUR,KLOR-CON M) 10 MEQ tablet Take 10 mEq by mouth daily       senna-docusate (SENOKOT-S;PERICOLACE) 8.6-50 MG per tablet Take 1-2 tablets by mouth 2 times daily Take while on oral narcotics to prevent or treat constipation. 30 tablet 0      simvastatin (ZOCOR) 40 MG tablet Take 20 mg by mouth At Bedtime       TESTOSTERONE 2MG Inject as directed every 14 days       tiZANidine (ZANAFLEX) 4 MG tablet Take 1-2 tablets (4-8 mg) by mouth 3 times daily as needed for muscle spasms 90 tablet 1     tiZANidine (ZANAFLEX) 4 MG tablet Take 1-2 tablets (4-8 mg) by mouth nightly as needed 30 tablet 1     traMADol (ULTRAM) 50 MG tablet Take 1-2 tablets ( mg) by mouth every 6 hours as needed for moderate pain 20 tablet 0     UNABLE TO FIND MEDICATION NAME: Super Enzymes Vitamin       venlafaxine (EFFEXOR) 75 MG tablet Take 75 mg by mouth 3 times daily       VITAMIN D, CHOLECALCIFEROL, PO Take 1,000 Units by mouth daily          No Known Allergies    SOCIAL HISTORY:    Social History     Socioeconomic History     Marital status:      Spouse name: Not on file     Number of children: Not on file     Years of education: Not on file     Highest education level: Not on file   Occupational History     Not on file   Social Needs     Financial resource strain: Not on file     Food insecurity:     Worry: Not on file     Inability: Not on file     Transportation needs:     Medical: Not on file     Non-medical: Not on file   Tobacco Use     Smoking status: Former Smoker     Packs/day: 1.00     Types: Cigarettes     Last attempt to quit: 2011     Years since quittin.4     Smokeless tobacco: Never Used   Substance and Sexual Activity     Alcohol use: Yes     Drug use: No     Sexual activity: Yes     Partners: Female   Lifestyle     Physical activity:     Days per week: Not on file     Minutes per session: Not on file     Stress: Not on file   Relationships     Social connections:     Talks on phone: Not on file     Gets together: Not on file     Attends Buddhist service: Not on file     Active member of club or organization: Not on file     Attends meetings of clubs or organizations: Not on file     Relationship status: Not on file     Intimate partner  violence:     Fear of current or ex partner: Not on file     Emotionally abused: Not on file     Physically abused: Not on file     Forced sexual activity: Not on file   Other Topics Concern     Parent/sibling w/ CABG, MI or angioplasty before 65F 55M? Not Asked   Social History Narrative     Not on file       FAMILY HISTORY: Reviewed in EMR      REVIEW OF SYSTEMS: Positive for that noted in past medical history and history of present illness and otherwise reviewed in EMR     PHYSICAL EXAM:    Adult male in no acute distress. Articulates and communicates with normal affect. Accompanied by his wife.  Respirations even and unlabored  Focused upper extremity exam: Skin intact. No erythema. Sensation intact all dermatomes into the hand to light touch. EPL, FPL, and Intrinsics intact. Right shoulder active motion is FE to 175, ER at side to 70, and IR to L5. Left shoulder active motion is FE to 175, ER to 70, and IR to T12.  Negative Neer and Pink. No pain on palpation over the AC joint. No pain on palpation over the long head of the biceps on right. Normal belly press bilaterally - does not recreate pain.    IMAGING:  Bilateral shoulder xrays demonstrate post-surgical change on L from biceps tenodesis. Mild (very early) inferior humeral osteophyte suggesting early OA.    ASSESSMENT:    1. Mild bilateral glenohumeral chondral loss (as evidenced by small humeral osteophyte)    PLAN:  Discussed exam and imaging findings. His anterior chest or midshaft clavicle location of his pain is difficult to explain or identify a precise pain generator. 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 This work may be exacerbating this symptom. I discussed returning to home exercise program to improve his rolled forward shoulder posture. He tells me he may or may not follow through on that. He instead is happy to follow up as needed.     Chayito Payton,  MD      Again, thank you for allowing me to participate in the care of your patient.        Sincerely,        Chayito Payton MD

## 2019-11-21 NOTE — PATIENT INSTRUCTIONS
Thanks for coming today.  Ortho/Sports Medicine Clinic  [address] [place] [address]    To schedule future appointments in Ortho Clinic, you may call [phone].    To schedule ordered imaging by your provider:   Call Central Imaging [phone]    To schedule an injection ordered by your provider:  Call Central Imaging Injection scheduling line: [phone]  [employer] available online at:  [email or web address]    Please call if any further questions or concerns ([phone]).  Clinic hours 8 am to 5 pm.    Return to clinic (call) if symptoms worsen or fail to improve.

## 2019-11-21 NOTE — PROGRESS NOTES
"CHIEF CONCERN:  Bilateral Shoulder Pain    HISTORY OF PRESENT ILLNESS:  Osbaldo is a pleasant 58 year old right hand dominant man who returns to see me to discuss bilateral shoulder pain.  He was previously known to me from a previous surgery (Left shoulder scope with open biceps tenodesis 3/8/2016).  He has now been having pain in both shoulders (R>L) about 4 months ago.  He works as a  and thinks that all of the heavy lifting and repetitive motions of his job contributes to this pain.  Pain at it's worst is a 7-8 on a scale of 1-10.  He relates that pain the worst when lifting above his head and when moving weight with arms outstretched.  He is feeling pain the most in his clavicle and biceps. His pain was quite bad 2 months ago but has been better this month.  Of note, his pain bilaterally is more over the midshaft clavicle/trap/anterior neck.      Past Medical History:   Diagnosis Date     Anxiety      Diabetes (H)     Type II     Hyperlipidaemia      Hypertension      Irregular heart beat     After spine surgery, \"my heart was beating so fast and irregular they brought me to another floor, \" He is thinking it was [facility].     Sleep apnea     Bipap     Testosterone deficiency        Past Surgical History:   Procedure Laterality Date     ARTHROSCOPY SHOULDER ROTATOR CUFF REPAIR Left 3/8/2016    Procedure: ARTHROSCOPY SHOULDER ROTATOR CUFF REPAIR;  Surgeon: Chayito Payton MD;  Location:  OR     ARTHROSCOPY SHOULDER, OPEN BICEP TENODESIS REPAIR, COMBINED Left 3/8/2016    Procedure: COMBINED ARTHROSCOPY SHOULDER, OPEN BICEP TENODESIS REPAIR;  Surgeon: Chayito Payton MD;  Location:  OR     BACK SURGERY  2002    Fusion     ENT SURGERY      T & A     SEPTOPLASTY       THORACIC SURGERY         Current Outpatient Medications   Medication Sig Dispense Refill     ALPRAZolam (XANAX) 1 MG tablet Take 1 tablet (1 mg) by mouth 3 times daily as needed for anxiety 1 tablet 0     amLODIPine " (NORVASC) 10 MG tablet Take 10 mg by mouth every evening        aspirin 81 MG tablet Take by mouth daily       diclofenac (VOLTAREN) 75 MG EC tablet Take 1 tablet (75 mg) by mouth 2 times daily as needed 40 tablet 1     furosemide (LASIX) 20 MG tablet Take 40 mg by mouth daily       gabapentin (NEURONTIN) 300 MG capsule Take 1 tablet (300 mg) every night for 1-3 days, then 1 tablet twice daily for 1-3 days, then 1 tablet three times daily 90 capsule 0     hydrOXYzine (ATARAX) 10 MG tablet Take 1 tablet (10 mg) by mouth every 6 hours as needed for itching (pain or muscle spasm) 30 tablet 0     lisinopril-hydrochlorothiazide (PRINZIDE,ZESTORETIC) 20-25 MG per tablet Take 1 tablet by mouth daily Holding am of surgery       meloxicam (MOBIC) 15 MG tablet TAKE ONE TABLET BY MOUTH ONCE DAILY 30 tablet 1     metFORMIN (GLUCOPHAGE) 500 MG tablet Take 500 mg by mouth 2 times daily (with meals) Hold day of surgery       methylPREDNISolone (MEDROL DOSEPAK) 4 MG tablet Follow package instructions 21 tablet 0     methylPREDNISolone (MEDROL DOSEPAK) 4 MG tablet Follow package instructions 21 tablet 0     Multiple Vitamins-Minerals (MULTIVITAMIN OR) Take 1 tablet by mouth daily       Omega-3 Fatty Acids (OMEGA-3 FISH OIL PO) Take 1 g by mouth daily       oxyCODONE (ROXICODONE) 5 MG immediate release tablet Take 1-2 tablets (5-10 mg) by mouth every 3 hours as needed for pain or other (Moderate to Severe) 80 tablet 0     potassium chloride SA (K-DUR,KLOR-CON M) 10 MEQ tablet Take 10 mEq by mouth daily       senna-docusate (SENOKOT-S;PERICOLACE) 8.6-50 MG per tablet Take 1-2 tablets by mouth 2 times daily Take while on oral narcotics to prevent or treat constipation. 30 tablet 0     simvastatin (ZOCOR) 40 MG tablet Take 20 mg by mouth At Bedtime       TESTOSTERONE 2MG Inject as directed every 14 days       tiZANidine (ZANAFLEX) 4 MG tablet Take 1-2 tablets (4-8 mg) by mouth 3 times daily as needed for muscle spasms 90 tablet 1      tiZANidine (ZANAFLEX) 4 MG tablet Take 1-2 tablets (4-8 mg) by mouth nightly as needed 30 tablet 1     traMADol (ULTRAM) 50 MG tablet Take 1-2 tablets ( mg) by mouth every 6 hours as needed for moderate pain 20 tablet 0     UNABLE TO FIND MEDICATION NAME: Super Enzymes Vitamin       venlafaxine (EFFEXOR) 75 MG tablet Take 75 mg by mouth 3 times daily       VITAMIN D, CHOLECALCIFEROL, PO Take 1,000 Units by mouth daily          No Known Allergies    SOCIAL HISTORY:    Social History     Socioeconomic History     Marital status:      Spouse name: Not on file     Number of children: Not on file     Years of education: Not on file     Highest education level: Not on file   Occupational History     Not on file   Social Needs     Financial resource strain: Not on file     Food insecurity:     Worry: Not on file     Inability: Not on file     Transportation needs:     Medical: Not on file     Non-medical: Not on file   Tobacco Use     Smoking status: Former Smoker     Packs/day: 1.00     Types: Cigarettes     Last attempt to quit: 2011     Years since quittin.4     Smokeless tobacco: Never Used   Substance and Sexual Activity     Alcohol use: Yes     Drug use: No     Sexual activity: Yes     Partners: Female   Lifestyle     Physical activity:     Days per week: Not on file     Minutes per session: Not on file     Stress: Not on file   Relationships     Social connections:     Talks on phone: Not on file     Gets together: Not on file     Attends Jain service: Not on file     Active member of club or organization: Not on file     Attends meetings of clubs or organizations: Not on file     Relationship status: Not on file     Intimate partner violence:     Fear of current or ex partner: Not on file     Emotionally abused: Not on file     Physically abused: Not on file     Forced sexual activity: Not on file   Other Topics Concern     Parent/sibling w/ CABG, MI or angioplasty before 65F 55M? Not  Asked   Social History Narrative     Not on file       FAMILY HISTORY: Reviewed in EMR      REVIEW OF SYSTEMS: Positive for that noted in past medical history and history of present illness and otherwise reviewed in EMR     PHYSICAL EXAM:    Adult male in no acute distress. Articulates and communicates with normal affect. Accompanied by his wife.  Respirations even and unlabored  Focused upper extremity exam: Skin intact. No erythema. Sensation intact all dermatomes into the hand to light touch. EPL, FPL, and Intrinsics intact. Right shoulder active motion is FE to 175, ER at side to 70, and IR to L5. Left shoulder active motion is FE to 175, ER to 70, and IR to T12.  Negative Neer and Pink. No pain on palpation over the AC joint. No pain on palpation over the long head of the biceps on right. Normal belly press bilaterally - does not recreate pain.    IMAGING:  Bilateral shoulder xrays demonstrate post-surgical change on L from biceps tenodesis. Mild (very early) inferior humeral osteophyte suggesting early OA.    ASSESSMENT:    1. Mild bilateral glenohumeral chondral loss (as evidenced by small humeral osteophyte)    PLAN:  Discussed exam and imaging findings. His anterior chest or midshaft clavicle location of his pain is difficult to explain or identify a precise pain generator. 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 This work may be exacerbating this symptom. I discussed returning to home exercise program to improve his rolled forward shoulder posture. He tells me he may or may not follow through on that. He instead is happy to follow up as needed.     Chayito Payton MD

## 2019-12-05 RX ORDER — ALBUTEROL SULFATE 90 UG/1
AEROSOL, METERED RESPIRATORY (INHALATION)
COMMUNITY
Start: 2019-08-14

## 2021-10-20 ENCOUNTER — ANCILLARY PROCEDURE (OUTPATIENT)
Dept: GENERAL RADIOLOGY | Facility: CLINIC | Age: 61
End: 2021-10-20
Attending: PREVENTIVE MEDICINE
Payer: COMMERCIAL

## 2021-10-20 ENCOUNTER — OFFICE VISIT (OUTPATIENT)
Dept: ORTHOPEDICS | Facility: CLINIC | Age: 61
End: 2021-10-20
Payer: COMMERCIAL

## 2021-10-20 DIAGNOSIS — G89.29 CHRONIC PAIN OF BOTH SHOULDERS: Primary | ICD-10-CM

## 2021-10-20 DIAGNOSIS — M12.812 ROTATOR CUFF ARTHROPATHY OF BOTH SHOULDERS: ICD-10-CM

## 2021-10-20 DIAGNOSIS — M12.811 ROTATOR CUFF ARTHROPATHY OF BOTH SHOULDERS: ICD-10-CM

## 2021-10-20 DIAGNOSIS — M75.112 INCOMPLETE ROTATOR CUFF TEAR OR RUPTURE OF LEFT SHOULDER, NOT SPECIFIED AS TRAUMATIC: ICD-10-CM

## 2021-10-20 DIAGNOSIS — M25.511 BILATERAL SHOULDER PAIN: ICD-10-CM

## 2021-10-20 DIAGNOSIS — M50.30 DEGENERATIVE DISC DISEASE, CERVICAL: ICD-10-CM

## 2021-10-20 DIAGNOSIS — M25.512 CHRONIC PAIN OF BOTH SHOULDERS: Primary | ICD-10-CM

## 2021-10-20 DIAGNOSIS — M25.512 BILATERAL SHOULDER PAIN: ICD-10-CM

## 2021-10-20 DIAGNOSIS — M50.30 DDD (DEGENERATIVE DISC DISEASE), CERVICAL: ICD-10-CM

## 2021-10-20 DIAGNOSIS — M25.511 CHRONIC PAIN OF BOTH SHOULDERS: Primary | ICD-10-CM

## 2021-10-20 DIAGNOSIS — M75.102 TEAR OF LEFT ROTATOR CUFF, UNSPECIFIED TEAR EXTENT, UNSPECIFIED WHETHER TRAUMATIC: ICD-10-CM

## 2021-10-20 PROCEDURE — 73030 X-RAY EXAM OF SHOULDER: CPT | Mod: LT | Performed by: RADIOLOGY

## 2021-10-20 PROCEDURE — 72040 X-RAY EXAM NECK SPINE 2-3 VW: CPT | Mod: GC | Performed by: STUDENT IN AN ORGANIZED HEALTH CARE EDUCATION/TRAINING PROGRAM

## 2021-10-20 PROCEDURE — 99204 OFFICE O/P NEW MOD 45 MIN: CPT | Performed by: PREVENTIVE MEDICINE

## 2021-10-20 RX ORDER — PREDNISONE 20 MG/1
40 TABLET ORAL DAILY
Qty: 12 TABLET | Refills: 0 | Status: SHIPPED | OUTPATIENT
Start: 2021-10-20

## 2021-10-20 RX ORDER — METHOCARBAMOL 500 MG/1
500-1000 TABLET, FILM COATED ORAL AT BEDTIME
Qty: 60 TABLET | Refills: 0 | Status: SHIPPED | OUTPATIENT
Start: 2021-10-20 | End: 2021-11-22

## 2021-10-20 NOTE — PROGRESS NOTES
HISTORY OF PRESENT ILLNESS  Mr. Valerio is a pleasant 60 year old year old male who presents to clinic today with neck and bilateral shoulder pain  Osbaldo explains that his shoulders have bothered him daily for the past year  Works as a   He had his left shoulder rotator cuff repaired 3 years ago, he has not fully recovered function  He has more neck pain as well that travels down his arms  Location: neck and bilateral shoulders  Quality:  achy pain    Severity: 7/10 at worst    Duration: see above  Timing: occurs intermittently  Context: occurs while exercising and lifting  Modifying factors:  resting and non-use makes it better, movement and use makes it worse  Associated signs & symptoms:neck pain radiates into arms    MEDICAL HISTORY  Patient Active Problem List   Diagnosis     Abnormal glucose     Morbid obesity (H)     Fatigue       Current Outpatient Medications   Medication Sig Dispense Refill     methocarbamol (ROBAXIN) 500 MG tablet Take 1-2 tablets (500-1,000 mg) by mouth At Bedtime 60 tablet 0     predniSONE (DELTASONE) 20 MG tablet Take 2 tablets (40 mg) by mouth daily 12 tablet 0     albuterol (VENTOLIN HFA) 108 (90 Base) MCG/ACT inhaler INHALE TWO PUFFS BY MOUTH EVERY 4 HOURS AS NEEDED       amLODIPine (NORVASC) 10 MG tablet Take 10 mg by mouth every evening        aspirin 81 MG tablet Take by mouth daily       furosemide (LASIX) 20 MG tablet Take 40 mg by mouth daily       lisinopril-hydrochlorothiazide (PRINZIDE,ZESTORETIC) 20-25 MG per tablet Take 1 tablet by mouth daily Holding am of surgery       metFORMIN (GLUCOPHAGE) 500 MG tablet Take 500 mg by mouth 2 times daily (with meals) Hold day of surgery       Multiple Vitamins-Minerals (MULTIVITAMIN OR) Take 1 tablet by mouth daily       Omega-3 Fatty Acids (OMEGA-3 FISH OIL PO) Take 1 g by mouth daily       potassium chloride SA (K-DUR,KLOR-CON M) 10 MEQ tablet Take 10 mEq by mouth daily       simvastatin (ZOCOR) 40 MG tablet Take 20 mg by  mouth At Bedtime       TESTOSTERONE 2MG Inject as directed every 14 days       UNABLE TO FIND MEDICATION NAME: Super Enzymes Vitamin       venlafaxine (EFFEXOR) 75 MG tablet Take 75 mg by mouth 3 times daily       VITAMIN D, CHOLECALCIFEROL, PO Take 1,000 Units by mouth daily         No Known Allergies    Family History   Problem Relation Age of Onset     Diabetes Mother         Type II     Heart Disease Father         Quad bypass     Social History     Socioeconomic History     Marital status:      Spouse name: Not on file     Number of children: Not on file     Years of education: Not on file     Highest education level: Not on file   Occupational History     Not on file   Tobacco Use     Smoking status: Former Smoker     Packs/day: 1.00     Types: Cigarettes     Quit date: 6/19/2011     Years since quitting: 10.3     Smokeless tobacco: Never Used   Substance and Sexual Activity     Alcohol use: Yes     Drug use: No     Sexual activity: Yes     Partners: Female   Other Topics Concern     Parent/sibling w/ CABG, MI or angioplasty before 65F 55M? Not Asked   Social History Narrative     Not on file     Social Determinants of Health     Financial Resource Strain:      Difficulty of Paying Living Expenses:    Food Insecurity:      Worried About Running Out of Food in the Last Year:      Ran Out of Food in the Last Year:    Transportation Needs:      Lack of Transportation (Medical):      Lack of Transportation (Non-Medical):    Physical Activity:      Days of Exercise per Week:      Minutes of Exercise per Session:    Stress:      Feeling of Stress :    Social Connections:      Frequency of Communication with Friends and Family:      Frequency of Social Gatherings with Friends and Family:      Attends Rastafarian Services:      Active Member of Clubs or Organizations:      Attends Club or Organization Meetings:      Marital Status:    Intimate Partner Violence:      Fear of Current or Ex-Partner:      Emotionally  Abused:      Physically Abused:      Sexually Abused:        Additional medical/Social/Surgical histories reviewed in [facility] and updated as appropriate.     REVIEW OF SYSTEMS (10/20/2021)  10 point ROS of systems including Constitutional, Eyes, Respiratory, Cardiovascular, Gastroenterology, Genitourinary, Integumentary, Musculoskeletal, Psychiatric, Allergic/Immunologic were all negative except for pertinent positives noted in my HPI.     PHYSICAL EXAM  VSS  General  - normal appearance, in no obvious distress  HEENT  - conjunctivae not injected, moist mucous membranes, normocephalic/atraumatic head, ears normal appearance, no lesions, mouth normal appearance, no scars, normal dentition and teeth present  CV  - normal peripheral perfusion  Pulm  - normal respiratory pattern, non-labored    Musculoskeletal - CERVICAL SPINE  - stance and posture: normal gait without limp, no obvious leg length discrepancy, normal heel and toe walk, normal balance, slightly forward shoulders, head balanced normally on trunk  - inspection: normal bone and joint alignment, no obvious kyphosis  - palpation: no paravertebral or bony tenderness, except at base of neck and trapezius muscles  - ROM: normal and painless flexion, extension, left and right sidebending and rotation with some discomfort  - strength: upper extremities 5/5 in all planes  - special tests:  (+) spurlings    Neuro  - biceps, triceps, supinator DTRs 2+ bilaterally, no sensory or motor deficit, grossly normal coordination, normal muscle tone  Skin  - no ecchymosis, erythema, warmth, or induration, no obvious rash  Psych  - interactive, appropriate, normal mood and affect  Bilateral shoulders: has pain with grimes, unable to lift left arm above 90 degrees due to pain and weakness, pain with internal rotation bilateral shoulders    ASSESSMENT & PLAN  61 yo male with bilateral shoulder rotator cuff arthropathy, left shoulder rotator cuff tear, cervical ddd, disc herniations,  radicular pain    I independently reviewed the following imaging studies:  Cervical xray: shows ddd  Bilateral shoulder xrays: shows AC arthritis, moderate gH arthritis  Discussed and ordered cervical MRI and left shoulder MRI due to previous rotator cuff tear and dysfunction  Given RX for prednisone and robaxin  Given HEP, continue HEP  F/u in 1-2 weeks      Appropriate PPE was utilized for prevention of spread of Covid-19.  Chace Cottrell MD, CAM

## 2021-10-20 NOTE — PATIENT INSTRUCTIONS
Thanks for coming today.  Ortho[facility] Medicine Clinic  [address] [place] [address]    To schedule future appointments in Ortho Clinic, you may call [phone].    To schedule ordered imaging by your provider:   Call [facility] [phone]    To schedule an injection ordered by your provider:  Call [facility] Injection scheduling line: [phone]  [facility] [employer] available online at:  [email or web address]    Please call if any further questions or concerns ([phone]).  Clinic hours 8 am to 5 pm.    Return to clinic (call) if symptoms worsen or fail to improve.

## 2021-10-20 NOTE — LETTER
10/20/2021         RE: Osbaldo Valerio  [address] [facility] [address]        Dear Colleague,    Thank you for referring your patient, Osbaldo Valerio, to the [facility] [place]. Please see a copy of my visit note below.          [facility] FOLLOW-UP VISIT 10/20/2021    Osbaldo Valerio's chief complaint for this visit includes:  Chief Complaint   Patient presents with     RECHECK     Bilateral shoulder pain (L>R)     PCP: Mick Varela    Referring Provider:  No referring provider defined for this encounter.    There were no vitals taken for this visit.  Data Unavailable       Interval History:     Follow up reason: Left shoulder pain    Date last seen: 4/17/21    Following Therapeutic Plan: Yes     Pain: Unchanged - Possibly worse in pain    Function: Unchanged    Interval History: Left glenohumeral debridement and open long head biceps tenodesis.     Medical History:    Any recent changes to your medical history? No    Any new medication prescribed since last visit? No    Review of Systems:    Do you have fever, chills, weight loss? No    Do you have any vision problems? No    Do you have any chest pain or edema? No    Do you have any shortness of breath or wheezing?  No    Do you have stomach problems? No    Do you have any urinary track issues? No    Do you have any numbness or focal weakness? No    Do you have diabetes? No    Do you have problems with bleeding or clotting? No    Do you have an rashes or other skin lesions? No    HISTORY OF PRESENT ILLNESS  Mr. Valerio is a pleasant 60 year old year old male who presents to clinic today with neck and bilateral shoulder pain  Osbaldo explains that his shoulders have bothered him daily for the past year  Works as a   He had his left shoulder rotator cuff repaired 3 years ago, he has not fully recovered function  He has more neck pain as well that travels down his  arms  Location: neck and bilateral shoulders  Quality:  achy pain    Severity: 7/10 at worst    Duration: see above  Timing: occurs intermittently  Context: occurs while exercising and lifting  Modifying factors:  resting and non-use makes it better, movement and use makes it worse  Associated signs & symptoms:neck pain radiates into arms    MEDICAL HISTORY  Patient Active Problem List   Diagnosis     Abnormal glucose     Morbid obesity (H)     Fatigue       Current Outpatient Medications   Medication Sig Dispense Refill     methocarbamol (ROBAXIN) 500 MG tablet Take 1-2 tablets (500-1,000 mg) by mouth At Bedtime 60 tablet 0     predniSONE (DELTASONE) 20 MG tablet Take 2 tablets (40 mg) by mouth daily 12 tablet 0     albuterol (VENTOLIN HFA) 108 (90 Base) MCG/ACT inhaler INHALE TWO PUFFS BY MOUTH EVERY 4 HOURS AS NEEDED       amLODIPine (NORVASC) 10 MG tablet Take 10 mg by mouth every evening        aspirin 81 MG tablet Take by mouth daily       furosemide (LASIX) 20 MG tablet Take 40 mg by mouth daily       lisinopril-hydrochlorothiazide (PRINZIDE,ZESTORETIC) 20-25 MG per tablet Take 1 tablet by mouth daily Holding am of surgery       metFORMIN (GLUCOPHAGE) 500 MG tablet Take 500 mg by mouth 2 times daily (with meals) Hold day of surgery       Multiple Vitamins-Minerals (MULTIVITAMIN OR) Take 1 tablet by mouth daily       Omega-3 Fatty Acids (OMEGA-3 FISH OIL PO) Take 1 g by mouth daily       potassium chloride SA (K-DUR,KLOR-CON M) 10 MEQ tablet Take 10 mEq by mouth daily       simvastatin (ZOCOR) 40 MG tablet Take 20 mg by mouth At Bedtime       TESTOSTERONE 2MG Inject as directed every 14 days       UNABLE TO FIND MEDICATION NAME: Super Enzymes Vitamin       venlafaxine (EFFEXOR) 75 MG tablet Take 75 mg by mouth 3 times daily       VITAMIN D, CHOLECALCIFEROL, PO Take 1,000 Units by mouth daily         No Known Allergies    Family History   Problem Relation Age of Onset     Diabetes Mother         Type II      Heart Disease Father         Quad bypass     Social History     Socioeconomic History     Marital status:      Spouse name: Not on file     Number of children: Not on file     Years of education: Not on file     Highest education level: Not on file   Occupational History     Not on file   Tobacco Use     Smoking status: Former Smoker     Packs/day: 1.00     Types: Cigarettes     Quit date: 6/19/2011     Years since quitting: 10.3     Smokeless tobacco: Never Used   Substance and Sexual Activity     Alcohol use: Yes     Drug use: No     Sexual activity: Yes     Partners: Female   Other Topics Concern     Parent/sibling w/ CABG, MI or angioplasty before 65F 55M? Not Asked   Social History Narrative     Not on file     Social Determinants of Health     Financial Resource Strain:      Difficulty of Paying Living Expenses:    Food Insecurity:      Worried About Running Out of Food in the Last Year:      Ran Out of Food in the Last Year:    Transportation Needs:      Lack of Transportation (Medical):      Lack of Transportation (Non-Medical):    Physical Activity:      Days of Exercise per Week:      Minutes of Exercise per Session:    Stress:      Feeling of Stress :    Social Connections:      Frequency of Communication with Friends and Family:      Frequency of Social Gatherings with Friends and Family:      Attends Episcopal Services:      Active Member of Clubs or Organizations:      Attends Club or Organization Meetings:      Marital Status:    Intimate Partner Violence:      Fear of Current or Ex-Partner:      Emotionally Abused:      Physically Abused:      Sexually Abused:        Additional medical/Social/Surgical histories reviewed in [facility] and updated as appropriate.     REVIEW OF SYSTEMS (10/20/2021)  10 point ROS of systems including Constitutional, Eyes, Respiratory, Cardiovascular, Gastroenterology, Genitourinary, Integumentary, Musculoskeletal, Psychiatric, Allergic/Immunologic were all negative  except for pertinent positives noted in my HPI.     PHYSICAL EXAM  VSS  General  - normal appearance, in no obvious distress  HEENT  - conjunctivae not injected, moist mucous membranes, normocephalic/atraumatic head, ears normal appearance, no lesions, mouth normal appearance, no scars, normal dentition and teeth present  CV  - normal peripheral perfusion  Pulm  - normal respiratory pattern, non-labored    Musculoskeletal - CERVICAL SPINE  - stance and posture: normal gait without limp, no obvious leg length discrepancy, normal heel and toe walk, normal balance, slightly forward shoulders, head balanced normally on trunk  - inspection: normal bone and joint alignment, no obvious kyphosis  - palpation: no paravertebral or bony tenderness, except at base of neck and trapezius muscles  - ROM: normal and painless flexion, extension, left and right sidebending and rotation with some discomfort  - strength: upper extremities 5/5 in all planes  - special tests:  (+) spurlings    Neuro  - biceps, triceps, supinator DTRs 2+ bilaterally, no sensory or motor deficit, grossly normal coordination, normal muscle tone  Skin  - no ecchymosis, erythema, warmth, or induration, no obvious rash  Psych  - interactive, appropriate, normal mood and affect  Bilateral shoulders: has pain with grimes, unable to lift left arm above 90 degrees due to pain and weakness, pain with internal rotation bilateral shoulders    ASSESSMENT & PLAN  59 yo male with bilateral shoulder rotator cuff arthropathy, left shoulder rotator cuff tear, cervical ddd, disc herniations, radicular pain    I independently reviewed the following imaging studies:  Cervical xray: shows ddd  Bilateral shoulder xrays: shows AC arthritis, moderate gH arthritis  Discussed and ordered cervical MRI and left shoulder MRI due to previous rotator cuff tear and dysfunction  Given RX for prednisone and robaxin  Given HEP, continue HEP  F/u in 1-2 weeks      Appropriate PPE was  utilized for prevention of spread of Covid-19.  Chace Cottrell MD, CAM        Again, thank you for allowing me to participate in the care of your patient.        Sincerely,        Chace Cottrell MD

## 2021-10-20 NOTE — PROGRESS NOTES
[facility] FOLLOW-UP VISIT 10/20/2021    Osbaldo Valerio's chief complaint for this visit includes:  Chief Complaint   Patient presents with     RECHECK     Bilateral shoulder pain (L>R)     PCP: Mick Varela    Referring Provider:  No referring provider defined for this encounter.    There were no vitals taken for this visit.  Data Unavailable       Interval History:     Follow up reason: Left shoulder pain    Date last seen: 4/17/21    Following Therapeutic Plan: Yes     Pain: Unchanged - Possibly worse in pain    Function: Unchanged    Interval History: Left glenohumeral debridement and open long head biceps tenodesis.     Medical History:    Any recent changes to your medical history? No    Any new medication prescribed since last visit? No    Review of Systems:    Do you have fever, chills, weight loss? No    Do you have any vision problems? No    Do you have any chest pain or edema? No    Do you have any shortness of breath or wheezing?  No    Do you have stomach problems? No    Do you have any urinary track issues? No    Do you have any numbness or focal weakness? No    Do you have diabetes? No    Do you have problems with bleeding or clotting? No    Do you have an rashes or other skin lesions? No

## 2021-10-20 NOTE — NURSING NOTE
Osbaldo Valerio's chief complaint for this visit includes:  Chief Complaint   Patient presents with     RECHECK     Bilateral shoulder pain (L>R)     PCP: Mick Varela    Referring Provider:  No referring provider defined for this encounter.    There were no vitals taken for this visit.  Data Unavailable     Do you need any medication refills at today's visit? No    Brenda Garza MA, ATC

## 2021-11-05 ENCOUNTER — TELEPHONE (OUTPATIENT)
Dept: ORTHOPEDICS | Facility: CLINIC | Age: 61
End: 2021-11-05

## 2021-11-05 NOTE — TELEPHONE ENCOUNTER
Pierre Palma-    This is a peer to peer request for MRI Shoulder procedure. The insurance is requesting this to better understand the reason why the test is being ordered. This peer to peer needs to be completed on or before 11/8/2021.     Please call the insurance company and reference the following information:    Payor phone number: [phone]    Case number: [id number]    Patient ID: [id number] (blue cross blue shield)    Reason why it was denied:You must show that you've failed to improve the following treatment ordered by your doctorf:   *There is no record that you had contact (in person, by mail, or messaging) with hour doctor after a recent (within three months) six week trial of treatment that failed to improve your symptoms. Supported treatment include but are not limited to medications for swelling or pain, physical therapy and oral or injected steroids.       Requesting response of outcome back to FC's on approval/denial with the following information:   o auth#  o date range  o who they spoke to     If you have any further questions please feel free to reach out to me.The patient is scheduled 11/10/2021 for this test.  Thank you for your attention on this.     Mare Mccollum  Senior Intake Financial Counselor  [facility]  [address] [place] [address]  Ph: [phone]  Fax: [phone]

## 2021-11-15 DIAGNOSIS — M50.30 DDD (DEGENERATIVE DISC DISEASE), CERVICAL: ICD-10-CM

## 2021-11-15 DIAGNOSIS — M25.511 CHRONIC PAIN OF BOTH SHOULDERS: ICD-10-CM

## 2021-11-15 DIAGNOSIS — G89.29 CHRONIC PAIN OF BOTH SHOULDERS: ICD-10-CM

## 2021-11-15 DIAGNOSIS — M25.512 CHRONIC PAIN OF BOTH SHOULDERS: ICD-10-CM

## 2021-11-22 ENCOUNTER — TELEPHONE (OUTPATIENT)
Dept: ORTHOPEDICS | Facility: CLINIC | Age: 61
End: 2021-11-22
Payer: COMMERCIAL

## 2021-11-22 DIAGNOSIS — M25.511 CHRONIC PAIN OF BOTH SHOULDERS: ICD-10-CM

## 2021-11-22 DIAGNOSIS — G89.29 CHRONIC PAIN OF BOTH SHOULDERS: ICD-10-CM

## 2021-11-22 DIAGNOSIS — M50.30 DEGENERATIVE DISC DISEASE, CERVICAL: ICD-10-CM

## 2021-11-22 DIAGNOSIS — M25.512 CHRONIC PAIN OF BOTH SHOULDERS: ICD-10-CM

## 2021-11-22 DIAGNOSIS — M50.30 DDD (DEGENERATIVE DISC DISEASE), CERVICAL: Primary | ICD-10-CM

## 2021-11-22 RX ORDER — METHOCARBAMOL 500 MG/1
500 TABLET, FILM COATED ORAL 4 TIMES DAILY PRN
Qty: 90 TABLET | Refills: 1 | Status: SHIPPED | OUTPATIENT
Start: 2021-11-22

## 2021-11-22 NOTE — TELEPHONE ENCOUNTER
[facility] Call Center    Phone Message    May a detailed message be left on voicemail: yes     Reason for Call: The patient spouse it requesting a call to discuss if the patient can move forward with an epidural without an MRI.  She stated the MRI was cancelled due to the insurance requiring PT prior to the MRI or a call from the Provider.  Please advise. Thank you.     Action Taken: Message routed to:  Adult Clinics: Sports Medicine p [id number]    Travel Screening: Not Applicable

## 2021-12-06 NOTE — TELEPHONE ENCOUNTER
Spoke with patient's wife, Jeannie.  Patient is agreeable to starting physical therapy for his neck and left shoulder prior to getting MRIs.    Will route to Dr. Cottrell to place order for PT if appropriate.      Jeannie is requesting a call back after order has been placed.  (Patient works in a loud shop and cannot take calls).

## 2021-12-20 ENCOUNTER — THERAPY VISIT (OUTPATIENT)
Dept: PHYSICAL THERAPY | Facility: CLINIC | Age: 61
End: 2021-12-20
Attending: PREVENTIVE MEDICINE
Payer: COMMERCIAL

## 2021-12-20 DIAGNOSIS — M50.30 DDD (DEGENERATIVE DISC DISEASE), CERVICAL: ICD-10-CM

## 2021-12-20 DIAGNOSIS — G89.29 CHRONIC PAIN OF BOTH SHOULDERS: ICD-10-CM

## 2021-12-20 DIAGNOSIS — M25.512 CHRONIC PAIN OF BOTH SHOULDERS: ICD-10-CM

## 2021-12-20 DIAGNOSIS — M54.2 NECK PAIN: ICD-10-CM

## 2021-12-20 DIAGNOSIS — M50.30 DEGENERATIVE DISC DISEASE, CERVICAL: ICD-10-CM

## 2021-12-20 DIAGNOSIS — M25.511 CHRONIC PAIN OF BOTH SHOULDERS: ICD-10-CM

## 2021-12-20 PROCEDURE — 97162 PT EVAL MOD COMPLEX 30 MIN: CPT | Mod: GP

## 2021-12-20 PROCEDURE — 97110 THERAPEUTIC EXERCISES: CPT | Mod: GP

## 2021-12-20 NOTE — PROGRESS NOTES
Physical Therapy Initial Evaluation  Subjective:  The history is provided by the patient. No  was used.   Patient Health History  Osbaldo Valerio being seen for Neck and Shoulders.     Problem began: 12/20/2019.      Pain is reported as 6/10 on pain scale.    Pertinent medical history includes: high blood pressure, sleep disorder/apnea and diabetes.        Surgeries include:  Orthopedic surgery.        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                  Therapist Generated HPI Evaluation  Problem details: Patient feels bilateral shoulder pain and neck pain that radiates down his arm/elbows to his wrists bilaterally with shoulder protraction. Has had for 2+ years and has progressively worsened.         Type of problem:  Cervical spine.    This is a chronic condition.  Condition occurred with:  Degenerative joint disease, insidious onset and repetition/overuse.  Where condition occurred: for unknown reasons.    Pain is described as sharp and aching and is constant.  Pain radiates to:  Shoulder left, shoulder right, upper arm left, upper arm right, elbow left, elbow right, lower arm left and lower arm right.   Since onset symptoms are gradually worsening.  Associated symptoms:  Loss of motion/stiffness and loss of strength. Symptoms are exacerbated by certain positions and change of position  and relieved by heat and activity/movement.      Restrictions due to condition include:  Working in normal job without restrictions.  Barriers include:  None as reported by patient.                        Objective:  System              Cervical/Thoracic Evaluation    AROM:  AROM Cervical:    Flexion:            Mild Limitation  Extension:       Mild Limitation  Rotation:         Left: Mild Limtation (Causes right UT tightness)     Right: Mild Limitation (Central)  Side Bend:      Left: WNL     Right:  Mild  Limitation        Cervical Myotomes:  Cervical myotomes: Generalized weakness through the left, but no specific myotome limitations.                                       Shoulder Evaluation:  ROM:  AROM:  : General restrictions in all directions of the shoulder.  Limited to approximately 150 degrees of scaption/flexion/abduction.                                  Strength:    Flexion: Left:3+/5   Pain:    Right: 4/5     Pain:     Abduction:  Left: 3+/5  Pain:    Right: 4/5     Pain:    Internal Rotation:  Left:3+/5     Pain:    Right: 3+/5     Pain:  External Rotation:   Left:3+/5     Pain:   Right:4/5     Pain:        Elbow Flexion:  Left:4+/5     Pain:    Right:4+/5     Pain:  Elbow Extension:  Left:4+/5     Pain:    Right:4+/5     Pain:  Stability Testing:  normal      Special Tests:    Left shoulder positive for the following special tests:  Impingement  Right shoulder positive for the following special tests:Impingement                                       [place] Cervical Evaluation        Test Movements:    PRO: During: no effect  After: no effect  Mechanical Response: no effect  Repeat PRO: During: no effect  After: no effect  Mechanical Response: no effect  RET: During: produces  After: worse  Mechanical Response: no effect  Repeat RET: During: no effect  After: no effect  Mechanical Response: IncROM                            Principle of Treatment:      Extension: Extension and Retraction at this time.                                             ROS    Assessment/Plan:    Patient is a 61 year old male with cervical complaints.    Patient has the following significant findings with corresponding treatment plan.                Diagnosis 1:  Neck Pain with Radicular Symptoms  Pain -  manual therapy, directional preference exercise and home program  Decreased ROM/flexibility - manual therapy, therapeutic exercise, therapeutic activity and home program  Decreased strength - therapeutic exercise, therapeutic  activities and home program  Diagnosis 2:  Bilateral Shoulder Pain   Pain -  hot/cold therapy, manual therapy, directional preference exercise and home program  Decreased ROM/flexibility - manual therapy, therapeutic exercise, therapeutic activity and home program  Decreased joint mobility - manual therapy, therapeutic exercise, therapeutic activity and home program  Decreased strength - therapeutic exercise, therapeutic activities and home program  Impaired posture - neuro re-education, therapeutic activities and home program    Therapy Evaluation Codes:   1) History comprised of:   Personal factors that impact the plan of care:      None.    Comorbidity factors that impact the plan of care are:      None.     Medications impacting care: None.  2) Examination of Body Systems comprised of:   Body structures and functions that impact the plan of care:      Cervical spine and Shoulder.   Activity limitations that impact the plan of care are:      Working.  3) Clinical presentation characteristics are:   Evolving/Changing.  4) Decision-Making    Moderate complexity using standardized patient assessment instrument and/or measureable assessment of functional outcome.  Cumulative Therapy Evaluation is: Moderate complexity.    Previous and current functional limitations:  (See Goal Flow Sheet for this information)    Short term and Long term goals: (See Goal Flow Sheet for this information)     Communication ability:  Patient appears to be able to clearly communicate and understand verbal and written communication and follow directions correctly.  Treatment Explanation - The following has been discussed with the patient:   RX ordered/plan of care  Anticipated outcomes  Possible risks and side effects  This patient would benefit from PT intervention to resume normal activities.   Rehab potential is good.    Frequency:  One X week, once daily  Duration:  for 8 weeks  Discharge Plan:  Achieve all LTG.  Independent in home  treatment program.  Reach maximal therapeutic benefit.    Please refer to the daily flowsheet for treatment today, total treatment time and time spent performing 1:1 timed codes.

## 2022-01-03 ENCOUNTER — THERAPY VISIT (OUTPATIENT)
Dept: PHYSICAL THERAPY | Facility: CLINIC | Age: 62
End: 2022-01-03
Payer: COMMERCIAL

## 2022-01-03 DIAGNOSIS — M25.512 CHRONIC PAIN OF BOTH SHOULDERS: ICD-10-CM

## 2022-01-03 DIAGNOSIS — M54.2 NECK PAIN: ICD-10-CM

## 2022-01-03 DIAGNOSIS — M25.511 CHRONIC PAIN OF BOTH SHOULDERS: ICD-10-CM

## 2022-01-03 DIAGNOSIS — G89.29 CHRONIC PAIN OF BOTH SHOULDERS: ICD-10-CM

## 2022-01-03 PROCEDURE — 97140 MANUAL THERAPY 1/> REGIONS: CPT | Mod: GP

## 2022-01-03 PROCEDURE — 97110 THERAPEUTIC EXERCISES: CPT | Mod: GP

## 2022-01-10 ENCOUNTER — THERAPY VISIT (OUTPATIENT)
Dept: PHYSICAL THERAPY | Facility: CLINIC | Age: 62
End: 2022-01-10
Payer: COMMERCIAL

## 2022-01-10 DIAGNOSIS — G89.29 CHRONIC PAIN OF BOTH SHOULDERS: ICD-10-CM

## 2022-01-10 DIAGNOSIS — M25.512 CHRONIC PAIN OF BOTH SHOULDERS: ICD-10-CM

## 2022-01-10 DIAGNOSIS — M54.2 NECK PAIN: ICD-10-CM

## 2022-01-10 DIAGNOSIS — M25.511 CHRONIC PAIN OF BOTH SHOULDERS: ICD-10-CM

## 2022-01-10 PROCEDURE — 97140 MANUAL THERAPY 1/> REGIONS: CPT | Mod: GP

## 2022-01-10 PROCEDURE — 97012 MECHANICAL TRACTION THERAPY: CPT | Mod: GP

## 2022-01-10 PROCEDURE — 97110 THERAPEUTIC EXERCISES: CPT | Mod: GP

## 2022-01-24 ENCOUNTER — THERAPY VISIT (OUTPATIENT)
Dept: PHYSICAL THERAPY | Facility: CLINIC | Age: 62
End: 2022-01-24
Payer: COMMERCIAL

## 2022-01-24 DIAGNOSIS — M25.512 CHRONIC PAIN OF BOTH SHOULDERS: ICD-10-CM

## 2022-01-24 DIAGNOSIS — M25.511 CHRONIC PAIN OF BOTH SHOULDERS: ICD-10-CM

## 2022-01-24 DIAGNOSIS — M54.2 NECK PAIN: ICD-10-CM

## 2022-01-24 DIAGNOSIS — G89.29 CHRONIC PAIN OF BOTH SHOULDERS: ICD-10-CM

## 2022-01-24 PROCEDURE — 97110 THERAPEUTIC EXERCISES: CPT | Mod: GP

## 2022-01-24 PROCEDURE — 97012 MECHANICAL TRACTION THERAPY: CPT | Mod: GP

## 2022-01-24 NOTE — PROGRESS NOTES
Subjective:  HPI  Physical Exam                    Objective:  System    Physical Exam    General     ROS    Assessment/Plan:    DISCHARGE REPORT    Progress reporting period is from 12/20/2021 to 1/24/2022.       SUBJECTIVE  Subjective: Symptoms continue to exist even under sustained traction.  Since both the start of therapy as well as since last session the patient reports no change in symptoms at all that are radiating from his neck down to his left wrist.  No relief with chin tucks, change of position, stretching, strengthening, manual therapy or mechanical traction. Given the lack of success, the patient questions the amount of progress he could gain from continued therapy.  PT agrees and recommends he follows up with his referring provider given his lack of progress.   Current Pain level: 6/10.     Initial Pain level: 6/10.   Changes in function:  None  Adverse reaction to treatment or activity: None    OBJECTIVE  Changes noted in objective findings:  None.  Still limited in ROM, NDI score hasn't improved and radicular symptoms still present.  Objective: Mild limitation in all directions except for flexion.  NDI: 13 (26%)     ASSESSMENT/PLAN  Updated problem list and treatment plan: Diagnosis 1:  Neck pain with left radicular pain  Pain -  mechanical traction, manual therapy, education, directional preference exercise and home program  Decreased ROM/flexibility - manual therapy, therapeutic exercise, therapeutic activity and home program  Decreased strength - therapeutic exercise, therapeutic activities and home program  STG/LTGs have been met or progress has been made towards goals:  Yes (See Goal flow sheet completed today.)  Assessment of Progress: The patient's condition is unchanged.  Self Management Plans:  Patient has been instructed in a home treatment program.  Patient is independent in a home treatment program.  Patient  has been instructed in self management of symptoms.  Patient is independent in  self management of symptoms.  I have re-evaluated this patient and find that the nature, scope, duration and intensity of the therapy is appropriate for the medical condition of the patient.  Osbaldo continues to require the following intervention to meet STG and LTG's:  PT intervention is no longer required to meet STG/LTG.    Recommendations:  Patient would benefit from the following:  Discharge from formal physical therapy and follow up with referring provider.  With all interventions provided, the patient has experienced no relief outside of a temporary reduction/elimination of symptoms during mechanical traction.  The patient was recommended to continue his chin tuck exercises unless told otherwise by his referring provider.            Please refer to the daily flowsheet for treatment today, total treatment time and time spent performing 1:1 timed codes.

## 2022-01-31 ENCOUNTER — OFFICE VISIT (OUTPATIENT)
Dept: ORTHOPEDICS | Facility: CLINIC | Age: 62
End: 2022-01-31
Payer: COMMERCIAL

## 2022-01-31 ENCOUNTER — TELEPHONE (OUTPATIENT)
Dept: ORTHOPEDICS | Facility: CLINIC | Age: 62
End: 2022-01-31

## 2022-01-31 VITALS — WEIGHT: 270 LBS | HEIGHT: 71 IN | BODY MASS INDEX: 37.8 KG/M2

## 2022-01-31 DIAGNOSIS — M50.30 DEGENERATIVE DISC DISEASE, CERVICAL: Primary | ICD-10-CM

## 2022-01-31 DIAGNOSIS — R45.89 ANXIETY ABOUT HEALTH: ICD-10-CM

## 2022-01-31 DIAGNOSIS — M54.12 CERVICAL RADICULAR PAIN: ICD-10-CM

## 2022-01-31 PROCEDURE — 99214 OFFICE O/P EST MOD 30 MIN: CPT | Performed by: PREVENTIVE MEDICINE

## 2022-01-31 RX ORDER — METHOCARBAMOL 500 MG/1
500-1000 TABLET, FILM COATED ORAL
Qty: 60 TABLET | Refills: 0 | Status: SHIPPED | OUTPATIENT
Start: 2022-01-31

## 2022-01-31 RX ORDER — PREDNISONE 20 MG/1
40 TABLET ORAL DAILY
Qty: 14 TABLET | Refills: 0 | Status: SHIPPED | OUTPATIENT
Start: 2022-01-31

## 2022-01-31 RX ORDER — LORAZEPAM 1 MG/1
1 TABLET ORAL
Qty: 1 TABLET | Refills: 0 | Status: SHIPPED | OUTPATIENT
Start: 2022-01-31 | End: 2022-02-21

## 2022-01-31 ASSESSMENT — MIFFLIN-ST. JEOR: SCORE: 2061.84

## 2022-01-31 NOTE — PROGRESS NOTES
HISTORY OF PRESENT ILLNESS  Mr. Valerio is a pleasant 61 year old year old male who presents to clinic today with ongoing neck pain and arm pains and radicular symptoms of pain and weakness in arms and hands  He has NOT improved since October 2021 when I last evaluated him.  He has completed 2 months of physical therapy- see notes.  He continues to have pain and weakness in his arms which makes me concerned about persisted nerve injury and he was unable to obtain an MRI yet due to lack of authorization  Osbaldo explains that he is not better  Location: neck and arms  Quality:  achy pain    Severity: 8/10 at worst    Duration: worse over past 6 months  Timing: occurs intermittently  Context: occurs daily  Modifying factors:  resting and non-use makes it better, movement and use makes it worse  Associated signs & symptoms: radiation of pain and weakness in arms and hands  MEDICAL HISTORY  Patient Active Problem List   Diagnosis     Abnormal glucose     Morbid obesity (H)     Fatigue       Current Outpatient Medications   Medication Sig Dispense Refill     albuterol (VENTOLIN HFA) 108 (90 Base) MCG/ACT inhaler INHALE TWO PUFFS BY MOUTH EVERY 4 HOURS AS NEEDED       amLODIPine (NORVASC) 10 MG tablet Take 10 mg by mouth every evening        aspirin 81 MG tablet Take by mouth daily       furosemide (LASIX) 20 MG tablet Take 40 mg by mouth daily       lisinopril-hydrochlorothiazide (PRINZIDE,ZESTORETIC) 20-25 MG per tablet Take 1 tablet by mouth daily Holding am of surgery       metFORMIN (GLUCOPHAGE) 500 MG tablet Take 500 mg by mouth 2 times daily (with meals) Hold day of surgery       methocarbamol (ROBAXIN) 500 MG tablet Take 1 tablet (500 mg) by mouth 4 times daily as needed for muscle spasms 90 tablet 1     Multiple Vitamins-Minerals (MULTIVITAMIN OR) Take 1 tablet by mouth daily       Omega-3 Fatty Acids (OMEGA-3 FISH OIL PO) Take 1 g by mouth daily       potassium chloride SA (K-DUR,KLOR-CON M) 10 MEQ tablet Take  10 mEq by mouth daily       predniSONE (DELTASONE) 20 MG tablet Take 2 tablets (40 mg) by mouth daily 12 tablet 0     simvastatin (ZOCOR) 40 MG tablet Take 20 mg by mouth At Bedtime       TESTOSTERONE 2MG Inject as directed every 14 days       UNABLE TO FIND MEDICATION NAME: Super Enzymes Vitamin       venlafaxine (EFFEXOR) 75 MG tablet Take 75 mg by mouth 3 times daily       VITAMIN D, CHOLECALCIFEROL, PO Take 1,000 Units by mouth daily         No Known Allergies    Family History   Problem Relation Age of Onset     Diabetes Mother         Type II     Heart Disease Father         Quad bypass     Social History     Socioeconomic History     Marital status:      Spouse name: Not on file     Number of children: Not on file     Years of education: Not on file     Highest education level: Not on file   Occupational History     Not on file   Tobacco Use     Smoking status: Former Smoker     Packs/day: 1.00     Types: Cigarettes     Quit date: 6/19/2011     Years since quitting: 10.6     Smokeless tobacco: Never Used   Substance and Sexual Activity     Alcohol use: Yes     Drug use: No     Sexual activity: Yes     Partners: Female   Other Topics Concern     Parent/sibling w/ CABG, MI or angioplasty before 65F 55M? Not Asked   Social History Narrative     Not on file     Social Determinants of Health     Financial Resource Strain: Not on file   Food Insecurity: Not on file   Transportation Needs: Not on file   Physical Activity: Not on file   Stress: Not on file   Social Connections: Not on file   Intimate Partner Violence: Not on file   Housing Stability: Not on file       Additional medical/Social/Surgical histories reviewed in [facility] and updated as appropriate.     REVIEW OF SYSTEMS (1/31/2022)  10 point ROS of systems including Constitutional, Eyes, Respiratory, Cardiovascular, Gastroenterology, Genitourinary, Integumentary, Musculoskeletal, Psychiatric, Allergic/Immunologic were all negative except for pertinent  "positives noted in my HPI.     PHYSICAL EXAM  Vitals:    01/31/22 0929   Weight: 122.5 kg (270 lb)   Height: 1.803 m (5' 11\")     Vital Signs: Ht 1.803 m (5' 11\")   Wt 122.5 kg (270 lb)   BMI 37.66 kg/m   Patient declined being weighed. Body mass index is 37.66 kg/m .    General  - normal appearance, in no obvious distress  HEENT  - conjunctivae not injected, moist mucous membranes, normocephalic/atraumatic head, ears normal appearance, no lesions, mouth normal appearance, no scars, normal dentition and teeth present  CV  - normal peripheral perfusion  Pulm  - normal respiratory pattern, non-labored    Musculoskeletal - CERVICAL SPINE  - stance and posture: normal gait without limp, no obvious leg length discrepancy, normal heel and toe walk, normal balance, slightly forward shoulders, head balanced normally on trunk  - inspection: normal bone and joint alignment, no obvious kyphosis  - palpation: no paravertebral or bony tenderness, except at base of neck and trapezius muscles  - ROM: normal and painless flexion, extension, left and right sidebending and rotation with some discomfort  - strength: upper extremities 5/5 in all planes  - special tests:  (+) spurlings    Neuro  - biceps, triceps, supinator DTRs 2+ bilaterally, no sensory or motor deficit, grossly normal coordination, normal muscle tone, 4/5  bilaterally due to weakness  Skin  - no ecchymosis, erythema, warmth, or induration, no obvious rash  Psych  - interactive, appropriate, normal mood and affect  ASSESSMENT & PLAN  60 yo male with cervical ddd, disc herniations, radicular pain, worse, not resolved    I independently reviewed the following imaging studies:  Cervical xray: shows ddd  Discussed and ordered cervical MRI that I recommended in Oct. 2021 due to the severity of pain, numbness and tingling and weakness related to cervical ddd and disc herniations as previously described  This patient has completed 2 months of physical therapy and " continues to have pain in his neck, arms and weakness and it is medically necessary to obtain a cervical MRI for this patient to properly care for him.  RX given for prednisone, robaxin, and Ativan for anxiety for MRI  Cont. HEP and PT exercises  F/u with me after MRI to discuss MARIANA vs. Surgical options.  Appropriate PPE was utilized for prevention of spread of Covid-19.  Chace Cottrlel MD, [facility]

## 2022-01-31 NOTE — TELEPHONE ENCOUNTER
[facility] Call Center    Phone Message    May a detailed message be left on voicemail: yes     Reason for Call: Other: Reconsideration of the denial needed by the doctor [facility] to [facility]. Please call patient wife regarding this at [phone]      Action Taken: Other: [facility]    Travel Screening: Not Applicable

## 2022-01-31 NOTE — LETTER
1/31/2022         RE: Osbaldo Valerio  [address] [facility] [facility] [address]        Dear Colleague,    Thank you for referring your patient, Osbaldo Valerio, to the [facility] [place]. Please see a copy of my visit note below.    HISTORY OF PRESENT ILLNESS  Mr. Valerio is a pleasant 61 year old year old male who presents to clinic today with ongoing neck pain and arm pains and radicular symptoms of pain and weakness in arms and hands  He has NOT improved since October 2021 when I last evaluated him.  He has completed 2 months of physical therapy- see notes.  He continues to have pain and weakness in his arms which makes me concerned about persisted nerve injury and he was unable to obtain an MRI yet due to lack of authorization  Osbaldo explains that he is not better  Location: neck and arms  Quality:  achy pain    Severity: 8/10 at worst    Duration: worse over past 6 months  Timing: occurs intermittently  Context: occurs daily  Modifying factors:  resting and non-use makes it better, movement and use makes it worse  Associated signs & symptoms: radiation of pain and weakness in arms and hands  MEDICAL HISTORY  Patient Active Problem List   Diagnosis     Abnormal glucose     Morbid obesity (H)     Fatigue       Current Outpatient Medications   Medication Sig Dispense Refill     albuterol (VENTOLIN HFA) 108 (90 Base) MCG/ACT inhaler INHALE TWO PUFFS BY MOUTH EVERY 4 HOURS AS NEEDED       amLODIPine (NORVASC) 10 MG tablet Take 10 mg by mouth every evening        aspirin 81 MG tablet Take by mouth daily       furosemide (LASIX) 20 MG tablet Take 40 mg by mouth daily       lisinopril-hydrochlorothiazide (PRINZIDE,ZESTORETIC) 20-25 MG per tablet Take 1 tablet by mouth daily Holding am of surgery       metFORMIN (GLUCOPHAGE) 500 MG tablet Take 500 mg by mouth 2 times daily (with meals) Hold day of surgery       methocarbamol (ROBAXIN) 500 MG tablet Take 1 tablet (500  mg) by mouth 4 times daily as needed for muscle spasms 90 tablet 1     Multiple Vitamins-Minerals (MULTIVITAMIN OR) Take 1 tablet by mouth daily       Omega-3 Fatty Acids (OMEGA-3 FISH OIL PO) Take 1 g by mouth daily       potassium chloride SA (K-DUR,KLOR-CON M) 10 MEQ tablet Take 10 mEq by mouth daily       predniSONE (DELTASONE) 20 MG tablet Take 2 tablets (40 mg) by mouth daily 12 tablet 0     simvastatin (ZOCOR) 40 MG tablet Take 20 mg by mouth At Bedtime       TESTOSTERONE 2MG Inject as directed every 14 days       UNABLE TO FIND MEDICATION NAME: Super Enzymes Vitamin       venlafaxine (EFFEXOR) 75 MG tablet Take 75 mg by mouth 3 times daily       VITAMIN D, CHOLECALCIFEROL, PO Take 1,000 Units by mouth daily         No Known Allergies    Family History   Problem Relation Age of Onset     Diabetes Mother         Type II     Heart Disease Father         Quad bypass     Social History     Socioeconomic History     Marital status:      Spouse name: Not on file     Number of children: Not on file     Years of education: Not on file     Highest education level: Not on file   Occupational History     Not on file   Tobacco Use     Smoking status: Former Smoker     Packs/day: 1.00     Types: Cigarettes     Quit date: 6/19/2011     Years since quitting: 10.6     Smokeless tobacco: Never Used   Substance and Sexual Activity     Alcohol use: Yes     Drug use: No     Sexual activity: Yes     Partners: Female   Other Topics Concern     Parent/sibling w/ CABG, MI or angioplasty before 65F 55M? Not Asked   Social History Narrative     Not on file     Social Determinants of Health     Financial Resource Strain: Not on file   Food Insecurity: Not on file   Transportation Needs: Not on file   Physical Activity: Not on file   Stress: Not on file   Social Connections: Not on file   Intimate Partner Violence: Not on file   Housing Stability: Not on file       Additional medical/Social/Surgical histories reviewed in [facility] and  "updated as appropriate.     REVIEW OF SYSTEMS (1/31/2022)  10 point ROS of systems including Constitutional, Eyes, Respiratory, Cardiovascular, Gastroenterology, Genitourinary, Integumentary, Musculoskeletal, Psychiatric, Allergic/Immunologic were all negative except for pertinent positives noted in my HPI.     PHYSICAL EXAM  Vitals:    01/31/22 0929   Weight: 122.5 kg (270 lb)   Height: 1.803 m (5' 11\")     Vital Signs: Ht 1.803 m (5' 11\")   Wt 122.5 kg (270 lb)   BMI 37.66 kg/m   Patient declined being weighed. Body mass index is 37.66 kg/m .    General  - normal appearance, in no obvious distress  HEENT  - conjunctivae not injected, moist mucous membranes, normocephalic/atraumatic head, ears normal appearance, no lesions, mouth normal appearance, no scars, normal dentition and teeth present  CV  - normal peripheral perfusion  Pulm  - normal respiratory pattern, non-labored    Musculoskeletal - CERVICAL SPINE  - stance and posture: normal gait without limp, no obvious leg length discrepancy, normal heel and toe walk, normal balance, slightly forward shoulders, head balanced normally on trunk  - inspection: normal bone and joint alignment, no obvious kyphosis  - palpation: no paravertebral or bony tenderness, except at base of neck and trapezius muscles  - ROM: normal and painless flexion, extension, left and right sidebending and rotation with some discomfort  - strength: upper extremities 5/5 in all planes  - special tests:  (+) spurlings    Neuro  - biceps, triceps, supinator DTRs 2+ bilaterally, no sensory or motor deficit, grossly normal coordination, normal muscle tone, 4/5  bilaterally due to weakness  Skin  - no ecchymosis, erythema, warmth, or induration, no obvious rash  Psych  - interactive, appropriate, normal mood and affect  ASSESSMENT & PLAN  60 yo male with cervical ddd, disc herniations, radicular pain, worse, not resolved    I independently reviewed the following imaging studies:  Cervical " xray: shows ddd  Discussed and ordered cervical MRI that I recommended in Oct. 2021 due to the severity of pain, numbness and tingling and weakness related to cervical ddd and disc herniations as previously described  This patient has completed 2 months of physical therapy and continues to have pain in his neck, arms and weakness and it is medically necessary to obtain a cervical MRI for this patient to properly care for him.  RX given for prednisone, robaxin, and Ativan for anxiety for MRI  Cont. HEP and PT exercises  F/u with me after MRI to discuss MARIANA vs. Surgical options.  Appropriate PPE was utilized for prevention of spread of Covid-19.  Chace Cottrell MD, CAQSM        Again, thank you for allowing me to participate in the care of your patient.        Sincerely,        Chace Cottrell MD

## 2022-02-02 NOTE — TELEPHONE ENCOUNTER
[facility] Call Center    Phone Message    May a detailed message be left on voicemail: yes     Reason for Call: Other: Patient's spouse, Jeannie left a message a couple days ago regarding a reconsideration letter and they're just following up on that. Please contact Jeannie back.     Action Taken: Message routed to:  [facility]  [facility] Medicine    Travel Screening: Not Applicable

## 2022-02-03 NOTE — TELEPHONE ENCOUNTER
Writer spoke with patient and advised them that a new prior authorization will be submitted since it has been more than 45 days since the previous denial for the MRI of the cervical spine.      The most recent off visit note will need to be completed and signed so that it can be sent to the insurance for review. Please review and sign this office note by 2/4/2022 as the new prior authorization has already been submitted and is pending medical notes.    Thanks.

## 2022-02-04 NOTE — TELEPHONE ENCOUNTER
Following up on this request again. The patient really wants to come in for the MRI and we are unable to start a prior authorization due to incomplete notes.

## 2022-02-21 ENCOUNTER — ANCILLARY PROCEDURE (OUTPATIENT)
Dept: MRI IMAGING | Facility: CLINIC | Age: 62
End: 2022-02-21
Attending: PREVENTIVE MEDICINE
Payer: COMMERCIAL

## 2022-02-21 DIAGNOSIS — M50.30 DEGENERATIVE DISC DISEASE, CERVICAL: ICD-10-CM

## 2022-02-21 DIAGNOSIS — M54.12 CERVICAL RADICULAR PAIN: ICD-10-CM

## 2022-02-21 DIAGNOSIS — R45.89 ANXIETY ABOUT HEALTH: ICD-10-CM

## 2022-02-21 PROCEDURE — 72141 MRI NECK SPINE W/O DYE: CPT | Mod: TC | Performed by: RADIOLOGY

## 2022-02-21 RX ORDER — LORAZEPAM 1 MG/1
1 TABLET ORAL
Qty: 1 TABLET | Refills: 0 | Status: SHIPPED | OUTPATIENT
Start: 2022-02-21

## 2022-03-07 ENCOUNTER — TELEPHONE (OUTPATIENT)
Dept: ORTHOPEDICS | Facility: CLINIC | Age: 62
End: 2022-03-07

## 2022-03-07 ENCOUNTER — OFFICE VISIT (OUTPATIENT)
Dept: ORTHOPEDICS | Facility: CLINIC | Age: 62
End: 2022-03-07
Payer: COMMERCIAL

## 2022-03-07 VITALS — SYSTOLIC BLOOD PRESSURE: 142 MMHG | DIASTOLIC BLOOD PRESSURE: 77 MMHG | HEART RATE: 86 BPM

## 2022-03-07 DIAGNOSIS — R45.89 ANXIETY ABOUT HEALTH: ICD-10-CM

## 2022-03-07 DIAGNOSIS — M50.20 CERVICAL DISC HERNIATION: ICD-10-CM

## 2022-03-07 DIAGNOSIS — M50.30 DEGENERATIVE DISC DISEASE, CERVICAL: Primary | ICD-10-CM

## 2022-03-07 PROCEDURE — 99214 OFFICE O/P EST MOD 30 MIN: CPT | Performed by: PREVENTIVE MEDICINE

## 2022-03-07 RX ORDER — LORAZEPAM 1 MG/1
1-2 TABLET ORAL
Qty: 2 TABLET | Refills: 0 | Status: SHIPPED | OUTPATIENT
Start: 2022-03-07

## 2022-03-07 NOTE — PROGRESS NOTES
HISTORY OF PRESENT ILLNESS  Mr. Valerio is a pleasant 61 year old year old male who presents to clinic today with   Osbaldo explains that he completed physical therapy for over 6 weeks during the past 3 months  And had cervical MRI finally after his insurance covered it  He continues to have the same symptoms of neck and shoulder pain that radiates into both arms  Medications only temporarily reduce his symtpoms  Wants to review MRI and make plan  Location: neck and arms  Quality:  achy pain    Severity: 9/10 at worst    Duration: worse over past 6 months  Timing: occurs intermittently  Context: occurs while exercising and lifting and using arms  Modifying factors:  resting and non-use makes it better, movement and use makes it worse  Associated signs & symptoms: numbness down both arms    MEDICAL HISTORY  Patient Active Problem List   Diagnosis     Abnormal glucose     Morbid obesity (H)     Fatigue       Current Outpatient Medications   Medication Sig Dispense Refill     albuterol (VENTOLIN HFA) 108 (90 Base) MCG/ACT inhaler INHALE TWO PUFFS BY MOUTH EVERY 4 HOURS AS NEEDED       amLODIPine (NORVASC) 10 MG tablet Take 10 mg by mouth every evening        aspirin 81 MG tablet Take by mouth daily       furosemide (LASIX) 20 MG tablet Take 40 mg by mouth daily       lisinopril-hydrochlorothiazide (PRINZIDE,ZESTORETIC) 20-25 MG per tablet Take 1 tablet by mouth daily Holding am of surgery       metFORMIN (GLUCOPHAGE) 500 MG tablet Take 500 mg by mouth 2 times daily (with meals) Hold day of surgery       methocarbamol (ROBAXIN) 500 MG tablet Take 1-2 tablets (500-1,000 mg) by mouth nightly as needed for muscle spasms 60 tablet 0     methocarbamol (ROBAXIN) 500 MG tablet Take 1 tablet (500 mg) by mouth 4 times daily as needed for muscle spasms 90 tablet 1     Multiple Vitamins-Minerals (MULTIVITAMIN OR) Take 1 tablet by mouth daily       Omega-3 Fatty Acids (OMEGA-3 FISH OIL PO) Take 1 g by mouth daily       potassium  chloride SA (K-DUR,KLOR-CON M) 10 MEQ tablet Take 10 mEq by mouth daily       simvastatin (ZOCOR) 40 MG tablet Take 20 mg by mouth At Bedtime       TESTOSTERONE 2MG Inject as directed every 14 days       UNABLE TO FIND MEDICATION NAME: Super Enzymes Vitamin       venlafaxine (EFFEXOR) 75 MG tablet Take 75 mg by mouth 3 times daily       LORazepam (ATIVAN) 1 MG tablet Take 1 tablet (1 mg) by mouth once as needed for anxiety (take 30 minutes prior to MRI) (Patient not taking: Reported on 3/7/2022) 1 tablet 0     predniSONE (DELTASONE) 20 MG tablet Take 2 tablets (40 mg) by mouth daily (Patient not taking: Reported on 3/7/2022) 14 tablet 0     predniSONE (DELTASONE) 20 MG tablet Take 2 tablets (40 mg) by mouth daily (Patient not taking: Reported on 3/7/2022) 12 tablet 0     VITAMIN D, CHOLECALCIFEROL, PO Take 1,000 Units by mouth daily (Patient not taking: Reported on 3/7/2022)         No Known Allergies    Family History   Problem Relation Age of Onset     Diabetes Mother         Type II     Heart Disease Father         Quad bypass     Social History     Socioeconomic History     Marital status:      Spouse name: None     Number of children: None     Years of education: None     Highest education level: None   Occupational History     None   Tobacco Use     Smoking status: Former Smoker     Packs/day: 1.00     Types: Cigarettes     Quit date: 6/19/2011     Years since quitting: 10.7     Smokeless tobacco: Never Used   Substance and Sexual Activity     Alcohol use: Yes     Drug use: No     Sexual activity: Yes     Partners: Female   Other Topics Concern     Parent/sibling w/ CABG, MI or angioplasty before 65F 55M? Not Asked   Social History Narrative     None     Social Determinants of Health     Financial Resource Strain: Not on file   Food Insecurity: Not on file   Transportation Needs: Not on file   Physical Activity: Not on file   Stress: Not on file   Social Connections: Not on file   Intimate Partner  Violence: Not on file   Housing Stability: Not on file       Additional medical/Social/Surgical histories reviewed in [facility] and updated as appropriate.     REVIEW OF SYSTEMS (3/7/2022)  10 point ROS of systems including Constitutional, Eyes, Respiratory, Cardiovascular, Gastroenterology, Genitourinary, Integumentary, Musculoskeletal, Psychiatric, Allergic/Immunologic were all negative except for pertinent positives noted in my HPI.     PHYSICAL EXAM  Vitals:    03/07/22 1228   BP: (!) 142/77   BP Location: Left arm   Patient Position: Sitting   Cuff Size: Adult Regular   Pulse: 86     Vital Signs: BP (!) 142/77 (BP Location: Left arm, Patient Position: Sitting, Cuff Size: Adult Regular)   Pulse 86  Patient declined being weighed. There is no height or weight on file to calculate BMI.    General  - normal appearance, in no obvious distress  HEENT  - conjunctivae not injected, moist mucous membranes, normocephalic/atraumatic head, ears normal appearance, no lesions, mouth normal appearance, no scars, normal dentition and teeth present  CV  - normal peripheral perfusion  Pulm  - normal respiratory pattern, non-labored    Musculoskeletal - CERVICAL SPINE  - stance and posture: normal gait without limp, no obvious leg length discrepancy, normal heel and toe walk, normal balance, slightly forward shoulders, head balanced normally on trunk  - inspection: normal bone and joint alignment, no obvious kyphosis  - palpation: no paravertebral or bony tenderness, except at base of neck and trapezius muscles  - ROM: normal and painless flexion, extension, left and right sidebending and rotation with some discomfort  - strength: upper extremities 5/5 in all planes  - special tests:  (+) spurlings    Neuro  - biceps, triceps, supinator DTRs 2+ bilaterally, no sensory or motor deficit, grossly normal coordination, normal muscle tone  Skin  - no ecchymosis, erythema, warmth, or induration, no obvious rash  Psych  - interactive,  appropriate, normal mood and affect  Bilateral shoulders: has pain with grimes, and raising overhead  ASSESSMENT & PLAN  62 yo male with cervical ddd, disc herniations, radicular pain, not resolved    I independently reviewed the following imaging studies:  Cervical MRI: shows ddd, disc herniations  Discussed and ordered cervical MARIANA for cervical disc herniation and ongoing cervical radicular pain not resolved with PT and medications  Continues to have numbness into both arms daily  F/u with me after MARIANA  Cont. Robaxin  RX was given for prednisone to take    Appropriate PPE was utilized for prevention of spread of Covid-19.  Chace Cottrell MD, CAQSM

## 2022-03-07 NOTE — LETTER
3/7/2022         RE: Osbaldo Valerio  [address] [facility] [address]        Dear Colleague,    Thank you for referring your patient, Osbaldo Valerio, to the [facility] [place]. Please see a copy of my visit note below.    HISTORY OF PRESENT ILLNESS  Mr. Valerio is a pleasant 61 year old year old male who presents to clinic today with   Osbaldo explains that he completed physical therapy for over 6 weeks during the past 3 months  And had cervical MRI finally after his insurance covered it  He continues to have the same symptoms of neck and shoulder pain that radiates into both arms  Medications only temporarily reduce his symtpoms  Wants to review MRI and make plan  Location: neck and arms  Quality:  achy pain    Severity: 9/10 at worst    Duration: worse over past 6 months  Timing: occurs intermittently  Context: occurs while exercising and lifting  Modifying factors:  resting and non-use makes it better, movement and use makes it worse  Associated signs & symptoms: numbness down both arms    MEDICAL HISTORY  Patient Active Problem List   Diagnosis     Abnormal glucose     Morbid obesity (H)     Fatigue       Current Outpatient Medications   Medication Sig Dispense Refill     albuterol (VENTOLIN HFA) 108 (90 Base) MCG/ACT inhaler INHALE TWO PUFFS BY MOUTH EVERY 4 HOURS AS NEEDED       amLODIPine (NORVASC) 10 MG tablet Take 10 mg by mouth every evening        aspirin 81 MG tablet Take by mouth daily       furosemide (LASIX) 20 MG tablet Take 40 mg by mouth daily       lisinopril-hydrochlorothiazide (PRINZIDE,ZESTORETIC) 20-25 MG per tablet Take 1 tablet by mouth daily Holding am of surgery       metFORMIN (GLUCOPHAGE) 500 MG tablet Take 500 mg by mouth 2 times daily (with meals) Hold day of surgery       methocarbamol (ROBAXIN) 500 MG tablet Take 1-2 tablets (500-1,000 mg) by mouth nightly as needed for muscle spasms 60 tablet 0     methocarbamol (ROBAXIN)  500 MG tablet Take 1 tablet (500 mg) by mouth 4 times daily as needed for muscle spasms 90 tablet 1     Multiple Vitamins-Minerals (MULTIVITAMIN OR) Take 1 tablet by mouth daily       Omega-3 Fatty Acids (OMEGA-3 FISH OIL PO) Take 1 g by mouth daily       potassium chloride SA (K-DUR,KLOR-CON M) 10 MEQ tablet Take 10 mEq by mouth daily       simvastatin (ZOCOR) 40 MG tablet Take 20 mg by mouth At Bedtime       TESTOSTERONE 2MG Inject as directed every 14 days       UNABLE TO FIND MEDICATION NAME: Super Enzymes Vitamin       venlafaxine (EFFEXOR) 75 MG tablet Take 75 mg by mouth 3 times daily       LORazepam (ATIVAN) 1 MG tablet Take 1 tablet (1 mg) by mouth once as needed for anxiety (take 30 minutes prior to MRI) (Patient not taking: Reported on 3/7/2022) 1 tablet 0     predniSONE (DELTASONE) 20 MG tablet Take 2 tablets (40 mg) by mouth daily (Patient not taking: Reported on 3/7/2022) 14 tablet 0     predniSONE (DELTASONE) 20 MG tablet Take 2 tablets (40 mg) by mouth daily (Patient not taking: Reported on 3/7/2022) 12 tablet 0     VITAMIN D, CHOLECALCIFEROL, PO Take 1,000 Units by mouth daily (Patient not taking: Reported on 3/7/2022)         No Known Allergies    Family History   Problem Relation Age of Onset     Diabetes Mother         Type II     Heart Disease Father         Quad bypass     Social History     Socioeconomic History     Marital status:      Spouse name: None     Number of children: None     Years of education: None     Highest education level: None   Occupational History     None   Tobacco Use     Smoking status: Former Smoker     Packs/day: 1.00     Types: Cigarettes     Quit date: 6/19/2011     Years since quitting: 10.7     Smokeless tobacco: Never Used   Substance and Sexual Activity     Alcohol use: Yes     Drug use: No     Sexual activity: Yes     Partners: Female   Other Topics Concern     Parent/sibling w/ CABG, MI or angioplasty before 65F 55M? Not Asked   Social History Narrative      None     Social Determinants of Health     Financial Resource Strain: Not on file   Food Insecurity: Not on file   Transportation Needs: Not on file   Physical Activity: Not on file   Stress: Not on file   Social Connections: Not on file   Intimate Partner Violence: Not on file   Housing Stability: Not on file       Additional medical/Social/Surgical histories reviewed in [facility] and updated as appropriate.     REVIEW OF SYSTEMS (3/7/2022)  10 point ROS of systems including Constitutional, Eyes, Respiratory, Cardiovascular, Gastroenterology, Genitourinary, Integumentary, Musculoskeletal, Psychiatric, Allergic/Immunologic were all negative except for pertinent positives noted in my HPI.     PHYSICAL EXAM  Vitals:    03/07/22 1228   BP: (!) 142/77   BP Location: Left arm   Patient Position: Sitting   Cuff Size: Adult Regular   Pulse: 86     Vital Signs: BP (!) 142/77 (BP Location: Left arm, Patient Position: Sitting, Cuff Size: Adult Regular)   Pulse 86  Patient declined being weighed. There is no height or weight on file to calculate BMI.    General  - normal appearance, in no obvious distress  HEENT  - conjunctivae not injected, moist mucous membranes, normocephalic/atraumatic head, ears normal appearance, no lesions, mouth normal appearance, no scars, normal dentition and teeth present  CV  - normal peripheral perfusion  Pulm  - normal respiratory pattern, non-labored    Musculoskeletal - CERVICAL SPINE  - stance and posture: normal gait without limp, no obvious leg length discrepancy, normal heel and toe walk, normal balance, slightly forward shoulders, head balanced normally on trunk  - inspection: normal bone and joint alignment, no obvious kyphosis  - palpation: no paravertebral or bony tenderness, except at base of neck and trapezius muscles  - ROM: normal and painless flexion, extension, left and right sidebending and rotation with some discomfort  - strength: upper extremities 5/5 in all planes  - special  tests:  (+) spurlings    Neuro  - biceps, triceps, supinator DTRs 2+ bilaterally, no sensory or motor deficit, grossly normal coordination, normal muscle tone  Skin  - no ecchymosis, erythema, warmth, or induration, no obvious rash  Psych  - interactive, appropriate, normal mood and affect  ASSESSMENT & PLAN  60 yo male with cervical ddd, disc herniations, radicular pain  I independently reviewed the following imaging studies:  Cervical MRI: shows ddd, disc herniations  Discussed and ordered cervical MARIANA for cervical disc herniation and ongoing cervical radicular pain not resolved with PT and medications  Continues to have numbness into both arms daily  F/u with me after MARIANA  Cont. Robaxin  RX was given for prednisone to take    Appropriate PPE was utilized for prevention of spread of Covid-19.  Chace Cottrell MD, CAM        Again, thank you for allowing me to participate in the care of your patient.        Sincerely,        Chace Cottrell MD

## 2022-03-07 NOTE — TELEPHONE ENCOUNTER
3/7 Provided phone number [phone] to schedule epidural injection. Provided phone number [phone] to schedule follow up with Dr. Payton for shoulder.     Alecia brown Procedure   Orthopedics, Podiatry, Sports Medicine, ENT/Eye Specialties  [facility] Surgery [facility]   [phone]

## 2022-03-07 NOTE — PATIENT INSTRUCTIONS
Thanks for coming today.  Ortho/Sports Medicine Clinic  [address] [place] [address]    To schedule future appointments in Ortho Clinic, you may call [phone].    To schedule ordered imaging or an injection ordered by your provider:  Call Central Imaging Injection scheduling line: [phone]    MyChart available online at:  [email or web address]    Please call if any further questions or concerns ([phone]).  Clinic hours 8 am to 5 pm.    Return to clinic (call) if symptoms worsen or fail to improve.

## 2022-03-07 NOTE — NURSING NOTE
Osbaldo Valerio's goals for this visit include:   Chief Complaint   Patient presents with     Results     Discuss Cervical MRI results dated 02/21/22       He requests these members of his care team be copied on today's visit information:     PCP: Mick Varela    Referring Provider:  No referring provider defined for this encounter.    BP (!) 142/77 (BP Location: Left arm, Patient Position: Sitting, Cuff Size: Adult Regular)   Pulse 86     Do you need any medication refills at today's visit? No  Adela Velarde CMA

## 2022-03-20 ENCOUNTER — HEALTH MAINTENANCE LETTER (OUTPATIENT)
Age: 62
End: 2022-03-20

## 2022-03-21 ENCOUNTER — TELEPHONE (OUTPATIENT)
Dept: ORTHOPEDICS | Facility: CLINIC | Age: 62
End: 2022-03-21

## 2022-03-21 ENCOUNTER — OFFICE VISIT (OUTPATIENT)
Dept: ORTHOPEDICS | Facility: CLINIC | Age: 62
End: 2022-03-21
Payer: COMMERCIAL

## 2022-03-21 DIAGNOSIS — M71.9 DISORDER OF BURSAE AND TENDONS IN SHOULDER REGION: Primary | ICD-10-CM

## 2022-03-21 DIAGNOSIS — M67.919 DISORDER OF BURSAE AND TENDONS IN SHOULDER REGION: Primary | ICD-10-CM

## 2022-03-21 PROCEDURE — 99213 OFFICE O/P EST LOW 20 MIN: CPT | Performed by: ORTHOPAEDIC SURGERY

## 2022-03-21 RX ORDER — DIAZEPAM 5 MG
5 TABLET ORAL
Qty: 2 TABLET | Refills: 0 | Status: SHIPPED | OUTPATIENT
Start: 2022-03-21

## 2022-03-21 NOTE — NURSING NOTE
Reason For Visit:   Chief Complaint   Patient presents with     RECHECK     Bilateral shoulder pain - wanting MRI       PCP: Mick Varela  Ref: Dr. Cottrell    ?  No  Occupation .  Currently working? Yes.  Work status?  Full time.  Date of surgery: s/p left extensive glenohumeral; left shoulder open long head biceps tenodesis DOS: 3/8/2016      Right hand dominant    SANE score  Affected shoulder: Bilateral  Right shoulder SANE: 75  Left shoulder SANE: 55    There were no vitals taken for this visit.    Brenda Garza, ATC

## 2022-03-21 NOTE — LETTER
3/21/2022         RE: Osbaldo Valerio  [address] [facility] [address]        Dear Colleague,    Thank you for referring your patient, Osbaldo Valerio, to the [facility]. Please see a copy of my visit note below.    Chief Concern: Bilateral shoulder pain    History of Present Illness:   Osbaldo Valerio is a 61 year old male who presents today for follow-up regarding on going bilateral shoulder pain. The patient has had pain in his shoulders, left greater than right, which has gotten worse over the past 6 months. He was seen by Dr. Cottrell on 03/07/21, where it was reported that he continues to have the same symptoms of shoulder pain that radiates into both arms. The patient states that medications only temporarily relief his pain. He says that his pain worsens with exercise and lifting with the arms. The patient says his pain is in the top of the left shoulder and radiates down the arm to the MCP joints. In October 2021, he was seen by Dr. Cottrell for his shoulders and later says he began physical therapy for this in December of 2021 through the end of January 2022.      Physical Examination:  Adult male in no acute distress. Articulates and communicates with normal affect.  Respirations even and unlabored  Focused upper extremity exam: forward elevation to 175 bilaterally. External rotation to 75 bilaterally. Internal rotation to L5 bilaterally. Global tenderness to palpation over anterior and superior aspect of the shoulders, left greater than right.    Assessment:   1. Bilateral shoulder pain.  2. S/p previous left biceps tenodesis.     Plan:   I discussed the patient's shoulder symptoms, history and imaging. I advised that the patient pay attention to symptoms that radiate down his arms after he receives his neck injection scheduled for tomorrow. I also placed an order for the patient to obtain MRIs of his shoulders. The patient is claustrophobic and so I will  prescribe him Valium for this. The patient voiced understanding of the information discussed and all questions were answered. I would like the patient to follow up with me following his MRI results.     Scribe Disclosure:  I, Juan Luis Venegas, am serving as a scribe to document services personally performed by Chayito Payton MD at this visit, based upon the provider's statements to me. All documentation has been reviewed by the aforementioned provider prior to being entered into the official medical record.     IJuan Luis, a scribe, prepared the chart for today's encounter.         Again, thank you for allowing me to participate in the care of your patient.        Sincerely,        Chayito Payton MD

## 2022-03-21 NOTE — PATIENT INSTRUCTIONS
Thanks for coming today.  Ortho/Sports Medicine Clinic  [address] [place] [address]    To schedule future appointments in Ortho Clinic, you may call [phone].    To schedule ordered imaging or an injection ordered by your provider:  Call Central Imaging Injection scheduling line: [phone]    MyChart available online at:  [email or web address]    Please call if any further questions or concerns ([phone]).  Clinic hours 8 am to 5 pm.    Return to clinic (call) if symptoms worsen or fail to improve.

## 2022-03-21 NOTE — TELEPHONE ENCOUNTER
3/21 Provided phone number [phone] to schedule mri's. Provided phone number [phone] to schedule follow up with Dr. Payton to discuss mri results.     Alecia brown Procedure   Orthopedics, Podiatry, Sports Medicine, ENT/Eye Specialties  [facility]   [phone]

## 2022-03-21 NOTE — PROGRESS NOTES
Chief Concern: Bilateral shoulder pain    History of Present Illness:   Osbaldo Valerio is a 61 year old male who presents today for follow-up regarding on going bilateral shoulder pain. The patient has had pain in his shoulders, left greater than right, which has gotten worse over the past 6 months. He was seen by Dr. Cottrell on 03/07/21, where it was reported that he continues to have the same symptoms of shoulder pain that radiates into both arms. The patient states that medications only temporarily relief his pain. He says that his pain worsens with exercise and lifting with the arms. The patient says his pain is in the top of the left shoulder and radiates down the arm to the MCP joints. In October 2021, he was seen by Dr. Cottrell for his shoulders and later says he began physical therapy for this in December of 2021 through the end of January 2022.      Physical Examination:  Adult male in no acute distress. Articulates and communicates with normal affect.  Respirations even and unlabored  Focused upper extremity exam: forward elevation to 175 bilaterally. External rotation to 75 bilaterally. Internal rotation to L5 bilaterally. Global tenderness to palpation over anterior and superior aspect of the shoulders, left greater than right.    Assessment:   1. Bilateral shoulder pain.  2. S/p previous left biceps tenodesis.     Plan:   I discussed the patient's shoulder symptoms, history and imaging. I advised that the patient pay attention to symptoms that radiate down his arms after he receives his neck injection scheduled for tomorrow. I also placed an order for the patient to obtain MRIs of his shoulders. The patient is claustrophobic and so I will prescribe him Valium for this. The patient voiced understanding of the information discussed and all questions were answered. I would like the patient to follow up with me following his MRI results.     Scribe Disclosure:  I, Juan Luis Venegas, am serving as a  scribe to document services personally performed by Chayito Payton MD at this visit, based upon the provider's statements to me. All documentation has been reviewed by the aforementioned provider prior to being entered into the official medical record.     IJuan Luis, a scribe, prepared the chart for today's encounter.

## 2022-03-22 ENCOUNTER — ANCILLARY PROCEDURE (OUTPATIENT)
Dept: GENERAL RADIOLOGY | Facility: CLINIC | Age: 62
End: 2022-03-22
Attending: PREVENTIVE MEDICINE
Payer: COMMERCIAL

## 2022-03-22 DIAGNOSIS — M50.30 DEGENERATIVE DISC DISEASE, CERVICAL: ICD-10-CM

## 2022-03-22 DIAGNOSIS — M50.20 CERVICAL DISC HERNIATION: ICD-10-CM

## 2022-03-22 PROCEDURE — 62321 NJX INTERLAMINAR CRV/THRC: CPT | Performed by: RADIOLOGY

## 2022-03-22 RX ORDER — IOPAMIDOL 408 MG/ML
10 INJECTION, SOLUTION INTRATHECAL ONCE
Status: COMPLETED | OUTPATIENT
Start: 2022-03-22 | End: 2022-03-22

## 2022-03-22 RX ORDER — LIDOCAINE HYDROCHLORIDE 10 MG/ML
5 INJECTION, SOLUTION EPIDURAL; INFILTRATION; INTRACAUDAL; PERINEURAL ONCE
Status: COMPLETED | OUTPATIENT
Start: 2022-03-22 | End: 2022-03-22

## 2022-03-22 RX ORDER — BETAMETHASONE SODIUM PHOSPHATE AND BETAMETHASONE ACETATE 3; 3 MG/ML; MG/ML
6 INJECTION, SUSPENSION INTRA-ARTICULAR; INTRALESIONAL; INTRAMUSCULAR; SOFT TISSUE ONCE
Status: COMPLETED | OUTPATIENT
Start: 2022-03-22 | End: 2022-03-22

## 2022-03-22 RX ADMIN — BETAMETHASONE SODIUM PHOSPHATE AND BETAMETHASONE ACETATE 18 MG: 3; 3 INJECTION, SUSPENSION INTRA-ARTICULAR; INTRALESIONAL; INTRAMUSCULAR; SOFT TISSUE at 13:30

## 2022-03-22 RX ADMIN — LIDOCAINE HYDROCHLORIDE 5 ML: 10 INJECTION, SOLUTION EPIDURAL; INFILTRATION; INTRACAUDAL; PERINEURAL at 13:31

## 2022-03-22 RX ADMIN — IOPAMIDOL 1 ML: 408 INJECTION, SOLUTION INTRATHECAL at 13:31

## 2022-03-22 NOTE — PROGRESS NOTES
: Osbaldo was seen in X-ray today for a cervical epidural injection. Patient rated pain before procedure 7/10. After procedure patient rated pain 6/10. This pain level is (is/is not) acceptable to patient. Patient discharged home with Wife.      AFTER YOU GO HOME    ? DO relax; minimize your activity for 24 hours  ? You may resume normal activity tomorrow  ? You may remove the bandage in the evening or next morning  ? You may resume bathing the next day  ? Drink at least 4 extra glasses of fluid today if not on fluid restrictions  ? DO NOT drive or operate machinery at home or at work for at least 24 hours      VISIT THE EMERGENCY ROOM OR URGENT CARE IF:    ? There is redness or swelling at the injection site  ? There is discharge from the injection site  ? You develop a temperature of 101  F or greater      ADDITIONAL INSTRUCTIONS:     ? You may resume your Coumadin or other blood thinner at your regular dose today.  Follow up with your physician to have your INR rechecked if indicated.  ? If you gain no relief from the injection after two (2) weeks, follow-up with your provider for your options.        Contacts:    During business hours from 8 to 5 pm, you may call [phone] to reach a nurse advisor at [facility].  After hours, call [facility]  [phone].  Ask for the Radiologist on-call.  Someone is on-call 24 hrs/day.  [facility] Toll Free Number   [phone]

## 2022-04-07 ENCOUNTER — ANCILLARY PROCEDURE (OUTPATIENT)
Dept: MRI IMAGING | Facility: CLINIC | Age: 62
End: 2022-04-07
Attending: ORTHOPAEDIC SURGERY
Payer: COMMERCIAL

## 2022-04-07 DIAGNOSIS — M71.9 DISORDER OF BURSAE AND TENDONS IN SHOULDER REGION: ICD-10-CM

## 2022-04-07 DIAGNOSIS — M67.919 DISORDER OF BURSAE AND TENDONS IN SHOULDER REGION: ICD-10-CM

## 2022-04-07 PROCEDURE — 73221 MRI JOINT UPR EXTREM W/O DYE: CPT | Mod: LT | Performed by: RADIOLOGY

## 2022-04-07 PROCEDURE — 73221 MRI JOINT UPR EXTREM W/O DYE: CPT | Mod: RT | Performed by: RADIOLOGY

## 2022-04-11 ENCOUNTER — OFFICE VISIT (OUTPATIENT)
Dept: ORTHOPEDICS | Facility: CLINIC | Age: 62
End: 2022-04-11
Payer: COMMERCIAL

## 2022-04-11 DIAGNOSIS — M75.111 INCOMPLETE TEAR OF RIGHT ROTATOR CUFF, UNSPECIFIED WHETHER TRAUMATIC: ICD-10-CM

## 2022-04-11 DIAGNOSIS — M75.112 INCOMPLETE TEAR OF LEFT ROTATOR CUFF, UNSPECIFIED WHETHER TRAUMATIC: Primary | ICD-10-CM

## 2022-04-11 PROCEDURE — 99214 OFFICE O/P EST MOD 30 MIN: CPT | Performed by: ORTHOPAEDIC SURGERY

## 2022-04-11 NOTE — NURSING NOTE
Reason For Visit:   Chief Complaint   Patient presents with     RECHECK     Follow up after MRI on 4/7       PCP: Mick Varela  Ref: Dr. Cottrell     ?  No  Occupation .  Currently working? Yes.  Work status?  Full time.  Date of surgery: s/p left extensive glenohumeral; left shoulder open long head biceps tenodesis DOS: 3/8/2016        Right hand dominant     SANE score  Affected shoulder: Bilateral  Right shoulder SANE: 75  Left shoulder SANE: 55    There were no vitals taken for this visit.    Brenda Garza, ATC

## 2022-04-11 NOTE — PROGRESS NOTES
CHIEF CONCERN:  Follow up B MRIs    HISTORY OF PRESENT ILLNESS:  Osbaldo is a 61 year old gentleman who returns today with his wife to discuss his recent B shoulder MRIs. His left is really bothering him, much more so than his right shoulder. His wife feels that both shoulders are exacerbated by his work.    PHYSICAL EXAM:    Adult male in no acute distress. Articulates and communicates with normal affect.  Respirations even and unlabored  Focused upper extremity exam: forward elevation to 175 bilaterally. External rotation to 75 bilaterally. Internal rotation to L5 bilaterally. Global tenderness to palpation over anterior and superior aspect of the shoulders, left greater than right.    IMAGING:  Right shoulder MRI dated 4/7/22 was reviewed and I agree with the Impression below:  Impression:  1. On a background of tendinosis, moderate grade articular sided tear of the supraspinatus anterior and middle fibers at the footprint involving approximately 9 mm in anteroposterior dimension.  2. Moderate grade intrasubstance tear of the subscapularis upper fibers at the footprint allowing for intrasubstance medial subluxation of the long head of biceps tendon. Mild subscapularis muscle bulk  loss.   3. Severe long head of biceps tendinosis intra-articular segment.   4. Posterior labral tearing. Possibly additional anteroinferior labral tearing suggestive by presumed paralabral cysts.    Left shoulder MRI dated 4/7/22 was reviewed and I agree with the Impression below:  Impression:  1. Rotator cuff:  *  Supraspinatus tendinosis with near full-thickness bursal and articular sided tearing of the far anterior fibers at the footprint.   *  Moderate grade articular sided tearing of the mid supraspinatus through mid infraspinatus.   *  Subscapularis tendinosis with near full-thickness tear of the upper fibers at the footprint.  *  Rotator cuff muscle bulk maintained.  2. Prior biceps tenodesis.  3. Mild subacromial-subdeltoid  bursitis.  4. Os acromiale with mild to moderate acromioclavicular joint degenerative change.    ASSESSMENT:  1. Bilateral rotator cuff partial thickness tears  2. Right long head biceps tendinopathy    PLAN:  I discussed the MRI images and the comparison of the left shoulder MRIs from 2015 to now. We discussed both nonoperative and operative treatment options. We discussed that rotator cuff disease can progress over time. We also reviewed that surgery is not a guarantee that his symptoms will resolve to the point that he can do his current work without exacerbation of his symptoms.   He is not sure he would be able to tolerate the time away from work that a cuff repair would mandate so he wishes to try the PT and injection route. In that case I will have him undergo US guided B glenohumeral joint injections and he will begin PT.    Chayito Payton MD

## 2022-04-11 NOTE — LETTER
4/11/2022         RE: Osbaldo Valerio  [address] [facility] [address]        Dear Colleague,    Thank you for referring your patient, Osbaldo Valeiro, to the [facility]. Please see a copy of my visit note below.    CHIEF CONCERN:  Follow up B MRIs    HISTORY OF PRESENT ILLNESS:  Osbaldo is a 61 year old gentleman who returns today with his wife to discuss his recent B shoulder MRIs. His left is really bothering him, much more so than his right shoulder. His wife feels that both shoulders are exacerbated by his work.    PHYSICAL EXAM:    Adult male in no acute distress. Articulates and communicates with normal affect.  Respirations even and unlabored  Focused upper extremity exam: forward elevation to 175 bilaterally. External rotation to 75 bilaterally. Internal rotation to L5 bilaterally. Global tenderness to palpation over anterior and superior aspect of the shoulders, left greater than right.    IMAGING:  Right shoulder MRI dated 4/7/22 was reviewed and I agree with the Impression below:  Impression:  1. On a background of tendinosis, moderate grade articular sided tear of the supraspinatus anterior and middle fibers at the footprint involving approximately 9 mm in anteroposterior dimension.  2. Moderate grade intrasubstance tear of the subscapularis upper fibers at the footprint allowing for intrasubstance medial subluxation of the long head of biceps tendon. Mild subscapularis muscle bulk  loss.   3. Severe long head of biceps tendinosis intra-articular segment.   4. Posterior labral tearing. Possibly additional anteroinferior labral tearing suggestive by presumed paralabral cysts.    Left shoulder MRI dated 4/7/22 was reviewed and I agree with the Impression below:  Impression:  1. Rotator cuff:  *  Supraspinatus tendinosis with near full-thickness bursal and articular sided tearing of the far anterior fibers at the footprint.   *  Moderate grade articular sided  tearing of the mid supraspinatus through mid infraspinatus.   *  Subscapularis tendinosis with near full-thickness tear of the upper fibers at the footprint.  *  Rotator cuff muscle bulk maintained.  2. Prior biceps tenodesis.  3. Mild subacromial-subdeltoid bursitis.  4. Os acromiale with mild to moderate acromioclavicular joint degenerative change.    ASSESSMENT:  1. Bilateral rotator cuff partial thickness tears  2. Right long head biceps tendinopathy    PLAN:  I discussed the MRI images and the comparison of the left shoulder MRIs from 2015 to now. We discussed both nonoperative and operative treatment options. We discussed that rotator cuff disease can progress over time. We also reviewed that surgery is not a guarantee that his symptoms will resolve to the point that he can do his current work without exacerbation of his symptoms.   He is not sure he would be able to tolerate the time away from work that a cuff repair would mandate so he wishes to try the PT and injection route. In that case I will have him undergo US guided B glenohumeral joint injections and he will begin PT.    Chayito Payton MD        Again, thank you for allowing me to participate in the care of your patient.        Sincerely,        Chayito Payton MD

## 2022-04-18 DIAGNOSIS — M75.112 INCOMPLETE TEAR OF LEFT ROTATOR CUFF, UNSPECIFIED WHETHER TRAUMATIC: Primary | ICD-10-CM

## 2022-04-19 PROBLEM — M75.112 INCOMPLETE TEAR OF LEFT ROTATOR CUFF, UNSPECIFIED WHETHER TRAUMATIC: Status: ACTIVE | Noted: 2022-04-19

## 2022-04-27 ENCOUNTER — OFFICE VISIT (OUTPATIENT)
Dept: ORTHOPEDICS | Facility: CLINIC | Age: 62
End: 2022-04-27
Payer: COMMERCIAL

## 2022-04-27 DIAGNOSIS — M19.011 GLENOHUMERAL ARTHRITIS, RIGHT: Primary | ICD-10-CM

## 2022-04-27 PROCEDURE — 99207 PR DROP WITH A PROCEDURE: CPT | Performed by: PREVENTIVE MEDICINE

## 2022-04-27 PROCEDURE — 20611 DRAIN/INJ JOINT/BURSA W/US: CPT | Mod: RT | Performed by: PREVENTIVE MEDICINE

## 2022-04-27 RX ORDER — METHYLPREDNISOLONE ACETATE 40 MG/ML
40 INJECTION, SUSPENSION INTRA-ARTICULAR; INTRALESIONAL; INTRAMUSCULAR; SOFT TISSUE
Status: SHIPPED | OUTPATIENT
Start: 2022-04-27

## 2022-04-27 RX ADMIN — METHYLPREDNISOLONE ACETATE 40 MG: 40 INJECTION, SUSPENSION INTRA-ARTICULAR; INTRALESIONAL; INTRAMUSCULAR; SOFT TISSUE at 14:43

## 2022-04-27 NOTE — NURSING NOTE
[facility] FOLLOW-UP VISIT 4/27/2022    Osbaldo Valerio's chief complaint for this visit includes:  Chief Complaint   Patient presents with     Consult     Right shoulder injection      PCP: Mick Varela    Referring Provider:  No referring provider defined for this encounter.    There were no vitals taken for this visit.  Data Unavailable       Interval History:     Follow up reason: Right shoulder injection    Date last seen: 3/7/22     Following Therapeutic Plan: Yes     Pain: Worsening    Function: Unchanged    What have you been doing since last vist: nothing      Medical History:    Any recent changes to your medical history? No    Any new medication prescribed since last visit? No

## 2022-04-27 NOTE — PROGRESS NOTES
Osbaldo sykes as discussed for right shoulder GH injection  Had limited improvement from previous cervical injection  Has plans to have surgery on left shoulder with Dr Payton in June    Diagnosis: right GH arthritis/degenerative labral tear  Right Glenohumeral Injection - Ultrasound Guided  The patient was informed of the risks and the benefits of the procedure and a written consent was signed.  The patient s right shoulder was prepped with chlorhexidine in sterile fashion.   40 mg of methylprednisolone suspension was drawn up into a 5 mL syringe with 3 mL of 1% lidocaine w/o Epi.  Injection was performed using sterile technique.  Under ultrasound guidance a 3.5-inch 22-gauge needle was used to enter the glenohumeral joint.  Posterior approach was used with the patient in lateral recumbent position, arm in neutral position at the side.  Needle placement was visualized and documented with ultrasound.  Ultrasound visualization was necessary due to the small joint space entered.  Injection performed long axis to the probe.  Injection solution visualized within the joint space.  Images were permanently stored for the patient's record.  There were no complications. The patient tolerated the procedure well. There was negligible bleeding.   Therapy scheduled to follow for mobilization.      Large Joint Injection/Arthocentesis: R glenohumeral joint    Date/Time: 4/27/2022 2:43 PM  Performed by: Chace Cottrell MD  Authorized by: Chace Cottrell MD     Indications:  Osteoarthritis and pain  Needle Size:  22 G  Guidance: ultrasound    Approach:  Posterolateral  Location:  Shoulder      Site:  R glenohumeral joint  Medications:  40 mg methylPREDNISolone 40 MG/ML  Outcome:  Tolerated well, no immediate complications  Procedure discussed: discussed risks, benefits, and alternatives    Timeout: timeout called immediately prior to procedure    Prep: patient was prepped and draped in usual sterile fashion

## 2022-04-27 NOTE — LETTER
4/27/2022         RE: Osbaldo Valerio  [address] [facility] [address]        Dear Colleague,    Thank you for referring your patient, Osbaldo Valerio, to the [facility] [place]. Please see a copy of my visit note below.    Osbaldo returns as discussed for right shoulder GH injection  Had limited improvement from previous cervical injection  Has plans to have surgery on left shoulder with Dr Payton in June    Diagnosis: right GH arthritis/degenerative labral tear  Right Glenohumeral Injection - Ultrasound Guided  The patient was informed of the risks and the benefits of the procedure and a written consent was signed.  The patient s right shoulder was prepped with chlorhexidine in sterile fashion.   40 mg of methylprednisolone suspension was drawn up into a 5 mL syringe with 3 mL of 1% lidocaine w/o Epi.  Injection was performed using sterile technique.  Under ultrasound guidance a 3.5-inch 22-gauge needle was used to enter the glenohumeral joint.  Posterior approach was used with the patient in lateral recumbent position, arm in neutral position at the side.  Needle placement was visualized and documented with ultrasound.  Ultrasound visualization was necessary due to the small joint space entered.  Injection performed long axis to the probe.  Injection solution visualized within the joint space.  Images were permanently stored for the patient's record.  There were no complications. The patient tolerated the procedure well. There was negligible bleeding.   Therapy scheduled to follow for mobilization.      Large Joint Injection/Arthocentesis: R glenohumeral joint    Date/Time: 4/27/2022 2:43 PM  Performed by: Chace Cottrell MD  Authorized by: Chace Cottrell MD     Indications:  Osteoarthritis and pain  Needle Size:  22 G  Guidance: ultrasound    Approach:  Posterolateral  Location:  Shoulder      Site:  R glenohumeral joint  Medications:  40 mg  methylPREDNISolone 40 MG/ML  Outcome:  Tolerated well, no immediate complications  Procedure discussed: discussed risks, benefits, and alternatives    Timeout: timeout called immediately prior to procedure    Prep: patient was prepped and draped in usual sterile fashion              Again, thank you for allowing me to participate in the care of your patient.        Sincerely,        Chace Cottrell MD

## 2022-05-12 ENCOUNTER — TELEPHONE (OUTPATIENT)
Dept: ORTHOPEDICS | Facility: CLINIC | Age: 62
End: 2022-05-12
Payer: COMMERCIAL

## 2022-05-12 NOTE — TELEPHONE ENCOUNTER
Good Morning,    Patients Prior Authorization for his Left arthroscopic rotator cuff repair, subacromial bursectomy procedure has been denied. The Financial Counselors are requesting a letter of Medical Necessity and documentation from an office visit that shows limited range of motion or loss of strength and a shoulder test that confirms there is damage to the shoulder to submit to the insurance company. If we can have this information back to us by 5/17/2022 that would be great. Please reach out if you have any questions.    Thanks!    Lory Fu    Financial Counselor  [address] [place] [address]  Phone- [phone]  Fax- [phone]

## 2022-05-12 NOTE — LETTER
"May 20, 2022      RE: Osbaldo Valerio  [address] [facility] [address]       To Whom It May Concern,    I am writing on behalf of my patient, Osbaldo Valerio to document the medical necessity of surgery on his left shoulder for the treatment of his left rotator cuff tear and left shoulder pain. His current range of motion is documented in the office visit note. His strength, and therefore function, are decreased by his pain. His left shoulder MRI done on 4/7/22 demonstrates the following:     \"1. Supraspinatus tendinosis with near full-thickness bursal and articular sided tearing of the far anterior fibers at the footprint.   2.  Moderate grade articular sided tearing of the mid supraspinatus through mid infraspinatus.   3.  Subscapularis tendinosis with near full-thickness tear of the upper fibers at the footprint.\"     The patient has been pursuing nonoperative left shoulder treatment under the direction of Dr. Chace Cottrell since December 2021. With ongoing pain limiting his function combined with the findings on his MRI, he has been appropriately indicated for operative treatment.     Please reconsider approving his surgery.     Sincerely,      Chayito Payton MD    "

## 2022-05-20 NOTE — TELEPHONE ENCOUNTER
Letter completed under the guidance of Dr. Payton. Please submit and/or let us know if anything needs to be added or changed. Thank you for your help.    Gerard Cates RN

## 2022-05-23 NOTE — TELEPHONE ENCOUNTER
Letter and previous records sent to [facility] requesting an urgent/fast appeal review. Patient notified.

## 2022-05-25 DIAGNOSIS — Z11.59 ENCOUNTER FOR SCREENING FOR OTHER VIRAL DISEASES: Primary | ICD-10-CM

## 2022-05-31 ENCOUNTER — LAB (OUTPATIENT)
Dept: LAB | Facility: CLINIC | Age: 62
End: 2022-05-31
Payer: COMMERCIAL

## 2022-05-31 ENCOUNTER — ANESTHESIA EVENT (OUTPATIENT)
Dept: SURGERY | Facility: AMBULATORY SURGERY CENTER | Age: 62
End: 2022-05-31
Payer: COMMERCIAL

## 2022-05-31 DIAGNOSIS — Z11.59 ENCOUNTER FOR SCREENING FOR OTHER VIRAL DISEASES: ICD-10-CM

## 2022-05-31 PROCEDURE — U0005 INFEC AGEN DETEC AMPLI PROBE: HCPCS

## 2022-05-31 PROCEDURE — U0003 INFECTIOUS AGENT DETECTION BY NUCLEIC ACID (DNA OR RNA); SEVERE ACUTE RESPIRATORY SYNDROME CORONAVIRUS 2 (SARS-COV-2) (CORONAVIRUS DISEASE [COVID-19]), AMPLIFIED PROBE TECHNIQUE, MAKING USE OF HIGH THROUGHPUT TECHNOLOGIES AS DESCRIBED BY CMS-2020-01-R: HCPCS

## 2022-06-01 LAB — SARS-COV-2 RNA RESP QL NAA+PROBE: NEGATIVE

## 2022-06-01 RX ORDER — NALOXONE HYDROCHLORIDE 0.4 MG/ML
0.4 INJECTION, SOLUTION INTRAMUSCULAR; INTRAVENOUS; SUBCUTANEOUS
Status: DISCONTINUED | OUTPATIENT
Start: 2022-06-01 | End: 2022-06-03 | Stop reason: HOSPADM

## 2022-06-01 RX ORDER — NALOXONE HYDROCHLORIDE 0.4 MG/ML
0.2 INJECTION, SOLUTION INTRAMUSCULAR; INTRAVENOUS; SUBCUTANEOUS
Status: DISCONTINUED | OUTPATIENT
Start: 2022-06-01 | End: 2022-06-03 | Stop reason: HOSPADM

## 2022-06-02 ENCOUNTER — HOSPITAL ENCOUNTER (OUTPATIENT)
Facility: AMBULATORY SURGERY CENTER | Age: 62
Discharge: HOME | End: 2022-06-02
Attending: ORTHOPAEDIC SURGERY | Admitting: ORTHOPAEDIC SURGERY
Payer: COMMERCIAL

## 2022-06-02 ENCOUNTER — ANESTHESIA (OUTPATIENT)
Dept: SURGERY | Facility: AMBULATORY SURGERY CENTER | Age: 62
End: 2022-06-02
Payer: COMMERCIAL

## 2022-06-02 VITALS
OXYGEN SATURATION: 91 % | TEMPERATURE: 97.9 F | BODY MASS INDEX: 39.91 KG/M2 | HEART RATE: 95 BPM | HEIGHT: 71 IN | DIASTOLIC BLOOD PRESSURE: 46 MMHG | SYSTOLIC BLOOD PRESSURE: 106 MMHG | WEIGHT: 285.06 LBS | RESPIRATION RATE: 16 BRPM

## 2022-06-02 DIAGNOSIS — M75.112 INCOMPLETE TEAR OF LEFT ROTATOR CUFF, UNSPECIFIED WHETHER TRAUMATIC: ICD-10-CM

## 2022-06-02 LAB
GLUCOSE BLDC GLUCOMTR-MCNC: 108 MG/DL (ref 70–99)
GLUCOSE BLDC GLUCOMTR-MCNC: 108 MG/DL (ref 70–99)

## 2022-06-02 PROCEDURE — 29827 SHO ARTHRS SRG RT8TR CUF RPR: CPT | Mod: LT

## 2022-06-02 PROCEDURE — G8907 PT DOC NO EVENTS ON DISCHARG: HCPCS

## 2022-06-02 PROCEDURE — G8916 PT W IV AB GIVEN ON TIME: HCPCS

## 2022-06-02 PROCEDURE — 29827 SHO ARTHRS SRG RT8TR CUF RPR: CPT | Mod: LT | Performed by: ORTHOPAEDIC SURGERY

## 2022-06-02 DEVICE — IMP ANCHOR ARTHREX BIO-SWIVELOCK 4.75X22MM AR-2324BCC-2: Type: IMPLANTABLE DEVICE | Site: SHOULDER | Status: FUNCTIONAL

## 2022-06-02 DEVICE — IMPLANTABLE DEVICE: Type: IMPLANTABLE DEVICE | Site: SHOULDER | Status: FUNCTIONAL

## 2022-06-02 RX ORDER — DIAZEPAM 10 MG/2ML
2.5 INJECTION, SOLUTION INTRAMUSCULAR; INTRAVENOUS
Status: DISCONTINUED | OUTPATIENT
Start: 2022-06-02 | End: 2022-06-03 | Stop reason: HOSPADM

## 2022-06-02 RX ORDER — ACETAMINOPHEN 325 MG/1
975 TABLET ORAL ONCE
Status: COMPLETED | OUTPATIENT
Start: 2022-06-02 | End: 2022-06-02

## 2022-06-02 RX ORDER — ALBUTEROL SULFATE 0.83 MG/ML
2.5 SOLUTION RESPIRATORY (INHALATION) EVERY 4 HOURS PRN
Status: DISCONTINUED | OUTPATIENT
Start: 2022-06-02 | End: 2022-06-03 | Stop reason: HOSPADM

## 2022-06-02 RX ORDER — PROPOFOL 10 MG/ML
INJECTION, EMULSION INTRAVENOUS PRN
Status: DISCONTINUED | OUTPATIENT
Start: 2022-06-02 | End: 2022-06-02

## 2022-06-02 RX ORDER — BUPIVACAINE HYDROCHLORIDE 5 MG/ML
INJECTION, SOLUTION EPIDURAL; INTRACAUDAL
Status: DISCONTINUED | OUTPATIENT
Start: 2022-06-02 | End: 2022-06-02

## 2022-06-02 RX ORDER — IBUPROFEN 600 MG/1
600 TABLET, FILM COATED ORAL EVERY 6 HOURS PRN
Qty: 40 TABLET | Refills: 0 | Status: SHIPPED | OUTPATIENT
Start: 2022-06-02

## 2022-06-02 RX ORDER — SODIUM CHLORIDE, SODIUM LACTATE, POTASSIUM CHLORIDE, CALCIUM CHLORIDE 600; 310; 30; 20 MG/100ML; MG/100ML; MG/100ML; MG/100ML
INJECTION, SOLUTION INTRAVENOUS CONTINUOUS
Status: DISCONTINUED | OUTPATIENT
Start: 2022-06-02 | End: 2022-06-03 | Stop reason: HOSPADM

## 2022-06-02 RX ORDER — ONDANSETRON 4 MG/1
4 TABLET, ORALLY DISINTEGRATING ORAL EVERY 30 MIN PRN
Status: DISCONTINUED | OUTPATIENT
Start: 2022-06-02 | End: 2022-06-03 | Stop reason: HOSPADM

## 2022-06-02 RX ORDER — FENTANYL CITRATE 50 UG/ML
25 INJECTION, SOLUTION INTRAMUSCULAR; INTRAVENOUS EVERY 5 MIN PRN
Status: DISCONTINUED | OUTPATIENT
Start: 2022-06-02 | End: 2022-06-03 | Stop reason: HOSPADM

## 2022-06-02 RX ORDER — FENTANYL CITRATE 50 UG/ML
25-50 INJECTION, SOLUTION INTRAMUSCULAR; INTRAVENOUS
Status: DISCONTINUED | OUTPATIENT
Start: 2022-06-02 | End: 2022-06-03 | Stop reason: HOSPADM

## 2022-06-02 RX ORDER — LABETALOL HYDROCHLORIDE 5 MG/ML
10 INJECTION, SOLUTION INTRAVENOUS
Status: DISCONTINUED | OUTPATIENT
Start: 2022-06-02 | End: 2022-06-03 | Stop reason: HOSPADM

## 2022-06-02 RX ORDER — ONDANSETRON 4 MG/1
4 TABLET, ORALLY DISINTEGRATING ORAL
Status: DISCONTINUED | OUTPATIENT
Start: 2022-06-02 | End: 2022-06-03 | Stop reason: HOSPADM

## 2022-06-02 RX ORDER — CEFAZOLIN SODIUM IN 0.9 % NACL 3 G/100 ML
3 INTRAVENOUS SOLUTION, PIGGYBACK (ML) INTRAVENOUS
Status: COMPLETED | OUTPATIENT
Start: 2022-06-02 | End: 2022-06-02

## 2022-06-02 RX ORDER — LIDOCAINE HYDROCHLORIDE 20 MG/ML
INJECTION, SOLUTION INFILTRATION; PERINEURAL PRN
Status: DISCONTINUED | OUTPATIENT
Start: 2022-06-02 | End: 2022-06-02

## 2022-06-02 RX ORDER — ACETAMINOPHEN 325 MG/1
650 TABLET ORAL EVERY 4 HOURS PRN
Qty: 50 TABLET | Refills: 0 | Status: SHIPPED | OUTPATIENT
Start: 2022-06-02

## 2022-06-02 RX ORDER — CEFAZOLIN SODIUM IN 0.9 % NACL 3 G/100 ML
3 INTRAVENOUS SOLUTION, PIGGYBACK (ML) INTRAVENOUS SEE ADMIN INSTRUCTIONS
Status: DISCONTINUED | OUTPATIENT
Start: 2022-06-02 | End: 2022-06-03 | Stop reason: HOSPADM

## 2022-06-02 RX ORDER — BUPIVACAINE HYDROCHLORIDE 2.5 MG/ML
INJECTION, SOLUTION EPIDURAL; INFILTRATION; INTRACAUDAL
Status: DISCONTINUED | OUTPATIENT
Start: 2022-06-02 | End: 2022-06-02

## 2022-06-02 RX ORDER — OXYCODONE HYDROCHLORIDE 5 MG/1
5 TABLET ORAL
Status: DISCONTINUED | OUTPATIENT
Start: 2022-06-02 | End: 2022-06-03 | Stop reason: HOSPADM

## 2022-06-02 RX ORDER — OXYCODONE HYDROCHLORIDE 5 MG/1
5 TABLET ORAL EVERY 4 HOURS PRN
Status: DISCONTINUED | OUTPATIENT
Start: 2022-06-02 | End: 2022-06-03 | Stop reason: HOSPADM

## 2022-06-02 RX ORDER — ONDANSETRON 2 MG/ML
4 INJECTION INTRAMUSCULAR; INTRAVENOUS EVERY 30 MIN PRN
Status: DISCONTINUED | OUTPATIENT
Start: 2022-06-02 | End: 2022-06-03 | Stop reason: HOSPADM

## 2022-06-02 RX ORDER — MEPERIDINE HYDROCHLORIDE 25 MG/ML
12.5 INJECTION INTRAMUSCULAR; INTRAVENOUS; SUBCUTANEOUS
Status: DISCONTINUED | OUTPATIENT
Start: 2022-06-02 | End: 2022-06-03 | Stop reason: HOSPADM

## 2022-06-02 RX ORDER — FLUMAZENIL 0.1 MG/ML
0.2 INJECTION, SOLUTION INTRAVENOUS
Status: DISCONTINUED | OUTPATIENT
Start: 2022-06-02 | End: 2022-06-03 | Stop reason: HOSPADM

## 2022-06-02 RX ORDER — ACETAMINOPHEN 325 MG/1
650 TABLET ORAL
Status: DISCONTINUED | OUTPATIENT
Start: 2022-06-02 | End: 2022-06-03 | Stop reason: HOSPADM

## 2022-06-02 RX ORDER — PROPOFOL 10 MG/ML
INJECTION, EMULSION INTRAVENOUS CONTINUOUS PRN
Status: DISCONTINUED | OUTPATIENT
Start: 2022-06-02 | End: 2022-06-02

## 2022-06-02 RX ORDER — FENTANYL CITRATE 50 UG/ML
25 INJECTION, SOLUTION INTRAMUSCULAR; INTRAVENOUS
Status: DISCONTINUED | OUTPATIENT
Start: 2022-06-02 | End: 2022-06-03 | Stop reason: HOSPADM

## 2022-06-02 RX ORDER — OXYCODONE HYDROCHLORIDE 5 MG/1
5-10 TABLET ORAL EVERY 4 HOURS PRN
Qty: 20 TABLET | Refills: 0 | Status: SHIPPED | OUTPATIENT
Start: 2022-06-02

## 2022-06-02 RX ORDER — LIDOCAINE 40 MG/G
CREAM TOPICAL
Status: DISCONTINUED | OUTPATIENT
Start: 2022-06-02 | End: 2022-06-03 | Stop reason: HOSPADM

## 2022-06-02 RX ORDER — FENTANYL CITRATE 50 UG/ML
INJECTION, SOLUTION INTRAMUSCULAR; INTRAVENOUS PRN
Status: DISCONTINUED | OUTPATIENT
Start: 2022-06-02 | End: 2022-06-02

## 2022-06-02 RX ORDER — ONDANSETRON 2 MG/ML
INJECTION INTRAMUSCULAR; INTRAVENOUS PRN
Status: DISCONTINUED | OUTPATIENT
Start: 2022-06-02 | End: 2022-06-02

## 2022-06-02 RX ORDER — AMOXICILLIN 250 MG
1-2 CAPSULE ORAL 2 TIMES DAILY
Qty: 30 TABLET | Refills: 0 | Status: SHIPPED | OUTPATIENT
Start: 2022-06-02

## 2022-06-02 RX ORDER — PHENYLEPHRINE HYDROCHLORIDE 10 MG/ML
INJECTION INTRAVENOUS PRN
Status: DISCONTINUED | OUTPATIENT
Start: 2022-06-02 | End: 2022-06-02

## 2022-06-02 RX ADMIN — PROPOFOL 200 MG: 10 INJECTION, EMULSION INTRAVENOUS at 10:09

## 2022-06-02 RX ADMIN — PROPOFOL 150 MCG/KG/MIN: 10 INJECTION, EMULSION INTRAVENOUS at 10:09

## 2022-06-02 RX ADMIN — Medication 0.5 MCG/KG/MIN: at 10:21

## 2022-06-02 RX ADMIN — Medication 10 MG: at 10:12

## 2022-06-02 RX ADMIN — Medication 3 G: at 10:02

## 2022-06-02 RX ADMIN — PHENYLEPHRINE HYDROCHLORIDE 100 MCG: 10 INJECTION INTRAVENOUS at 11:05

## 2022-06-02 RX ADMIN — Medication 10 MG: at 11:01

## 2022-06-02 RX ADMIN — Medication 10 MG: at 10:40

## 2022-06-02 RX ADMIN — FENTANYL CITRATE 50 MCG: 50 INJECTION, SOLUTION INTRAMUSCULAR; INTRAVENOUS at 08:57

## 2022-06-02 RX ADMIN — Medication 60 MG: at 10:09

## 2022-06-02 RX ADMIN — LIDOCAINE HYDROCHLORIDE 60 MG: 20 INJECTION, SOLUTION INFILTRATION; PERINEURAL at 10:09

## 2022-06-02 RX ADMIN — FENTANYL CITRATE 25 MCG: 50 INJECTION, SOLUTION INTRAMUSCULAR; INTRAVENOUS at 10:09

## 2022-06-02 RX ADMIN — BUPIVACAINE HYDROCHLORIDE 10 ML: 5 INJECTION, SOLUTION EPIDURAL; INTRACAUDAL at 09:10

## 2022-06-02 RX ADMIN — ACETAMINOPHEN 975 MG: 325 TABLET ORAL at 08:22

## 2022-06-02 RX ADMIN — BUPIVACAINE HYDROCHLORIDE 5 ML: 2.5 INJECTION, SOLUTION EPIDURAL; INFILTRATION; INTRACAUDAL at 09:10

## 2022-06-02 RX ADMIN — FENTANYL CITRATE 25 MCG: 50 INJECTION, SOLUTION INTRAMUSCULAR; INTRAVENOUS at 10:41

## 2022-06-02 RX ADMIN — Medication 10 MG: at 10:57

## 2022-06-02 RX ADMIN — ONDANSETRON 4 MG: 2 INJECTION INTRAMUSCULAR; INTRAVENOUS at 10:23

## 2022-06-02 RX ADMIN — SODIUM CHLORIDE, SODIUM LACTATE, POTASSIUM CHLORIDE, CALCIUM CHLORIDE: 600; 310; 30; 20 INJECTION, SOLUTION INTRAVENOUS at 08:28

## 2022-06-02 RX ADMIN — PHENYLEPHRINE HYDROCHLORIDE 100 MCG: 10 INJECTION INTRAVENOUS at 10:21

## 2022-06-02 ASSESSMENT — LIFESTYLE VARIABLES: TOBACCO_USE: 1

## 2022-06-02 NOTE — ANESTHESIA CARE TRANSFER NOTE
Patient: Osbaldo Valerio    Procedure: Procedure(s):  Left arthroscopic rotator cuff repair, subacromial bursectomy       Diagnosis: Incomplete tear of left rotator cuff, unspecified whether traumatic [M75.112]  Diagnosis Additional Information: No value filed.    Anesthesia Type:   General     Note:    Oropharynx: oral airway in place  Level of Consciousness: drowsy  Oxygen Supplementation: face mask  Level of Supplemental Oxygen (L/min / FiO2): 8  Independent Airway: airway patency satisfactory and stable  Dentition: dentition unchanged  Vital Signs Stable: post-procedure vital signs reviewed and stable  Report to RN Given: handoff report given  Patient transferred to: PACU    Handoff Report: Identifed the Patient, Identified the Reponsible Provider, Reviewed the pertinent medical history, Discussed the surgical course, Reviewed Intra-OP anesthesia mangement and issues during anesthesia, Set expectations for post-procedure period and Allowed opportunity for questions and acknowledgement of understanding      Vitals:  Vitals Value Taken Time   /74 06/02/22 1141   Temp 98.1  F (36.7  C) 06/02/22 1141   Pulse 72 06/02/22 1141   Resp 16 06/02/22 1141   SpO2 95 % 06/02/22 1141   Vitals shown include unvalidated device data.    Electronically Signed By: JOEL Hendrix CRNA  June 2, 2022  11:42 AM

## 2022-06-02 NOTE — ANESTHESIA PROCEDURE NOTES
Brachial plexus Procedure Note    Pre-Procedure   Staff -        Anesthesiologist:  Chace Butcher MD       Performed By: anesthesiologist       Location: pre-op       Procedure Start/Stop Times: 6/2/2022 9:10 AM and 6/2/2022 9:20 AM       Pre-Anesthestic Checklist: patient identified, IV checked, site marked, risks and benefits discussed, informed consent, monitors and equipment checked, pre-op evaluation, at physician/surgeon's request and post-op pain management  Timeout:       Correct Patient: Yes        Correct Procedure: Yes        Correct Site: Yes        Correct Position: Yes        Correct Laterality: Yes        Site Marked: Yes  Procedure Documentation  Procedure: Brachial plexus       Laterality: left       Patient Position: sitting       Skin prep: Chloraprep       Local skin infiltrated with 2 mL of 1% lidocaine.  (superior trunk block approach).       Needle Type: insulated       Needle Gauge: 22.        Needle Length (millimeters): 100        Ultrasound guided       1. Ultrasound was used to identify targeted nerve, plexus, vascular marker, or fascial plane and place a needle adjacent to it in real-time.       2. Ultrasound was used to visualize the spread of anesthetic in close proximity to the above referenced structure.       3. A permanent image is entered into the patient's record.       4. The visualized anatomic structures appeared normal.       5. There were no apparent abnormal pathologic findings.       Nerve Stim: Initial Level 0.5 mA.  Lowest motor response 0.5 mA.    Assessment/Narrative         The placement was negative for: blood aspirated and painful injection       Paresthesias: Resolved.       Bolus given via needle..        Secured via.        Insertion/Infusion Method: Single Shot    Medication(s) Administered   Bupivacaine 0.5% PF (Infiltration) - Infiltration   10 mL - 6/2/2022 9:10:00 AM  Bupivacaine 0.25% PF (Infiltration) - Infiltration   5 mL - 6/2/2022 9:10:00  AM  Bupivacaine liposome (Exparel) 1.3% LA inj susp (Infiltration) - Infiltration   10 mL - 6/2/2022 9:10:00 AM  Medication Administration Time: 6/2/2022 9:10 AM     Comments:  10 mL of Exparel plus 10 mL of 0.5% bupivacaine used for the superior trunk block.  5 mL of 0.25% bupivacaine injected upon withdrawal of the needle to block the superficial cervical plexus

## 2022-06-02 NOTE — ANESTHESIA POSTPROCEDURE EVALUATION
Patient: Osbaldo Valerio    Procedure: Procedure(s):  Left arthroscopic rotator cuff repair, subacromial bursectomy       Anesthesia Type:  General, Peripheral Nerve Block    Note:  Disposition: Outpatient   Postop Pain Control: Uneventful            Sign Out: Well controlled pain   PONV: No   Neuro/Psych: Uneventful            Sign Out: Acceptable/Baseline neuro status   Airway/Respiratory: Uneventful            Sign Out: Acceptable/Baseline resp. status   CV/Hemodynamics: Uneventful            Sign Out: Acceptable CV status; No obvious hypovolemia; No obvious fluid overload   Other NRE: NONE   DID A NON-ROUTINE EVENT OCCUR? No           Last vitals:  Vitals Value Taken Time   /58 06/02/22 1210   Temp 98.1  F (36.7  C) 06/02/22 1141   Pulse 95 06/02/22 1210   Resp 69 06/02/22 1204   SpO2 89 % 06/02/22 1217   Vitals shown include unvalidated device data.    Electronically Signed By: Chace Butcher MD  June 2, 2022  1:56 PM

## 2022-06-02 NOTE — INTERVAL H&P NOTE
"I have reviewed the surgical (or preoperative) H&P that is linked to this encounter, and examined the patient. There are no significant changes    Clinical Conditions Present on Arrival:  Clinically Significant Risk Factors Present on Admission                  # Platelet Defect: home medication list includes an antiplatelet medication  # Obesity: Estimated body mass index is 39.76 kg/m  as calculated from the following:    Height as of this encounter: 1.803 m (5' 11\").    Weight as of this encounter: 129.3 kg (285 lb 0.9 oz).       "

## 2022-06-02 NOTE — ANESTHESIA PREPROCEDURE EVALUATION
"Anesthesia Pre-Procedure Evaluation    Patient: Osbaldo Valerio   MRN: [id number] : 1960        Procedure : Procedure(s):  Left arthroscopic rotator cuff repair, subacromial bursectomy          Past Medical History:   Diagnosis Date     Anxiety      Diabetes (H)     Type II     Hyperlipidaemia      Hypertension      Irregular heart beat     After spine surgery, \"my heart was beating so fast and irregular they brought me to another floor, \" He is thinking it was [facility].     Sleep apnea     Bipap     Testosterone deficiency       Past Surgical History:   Procedure Laterality Date     ARTHROSCOPY SHOULDER ROTATOR CUFF REPAIR Left 3/8/2016    Procedure: ARTHROSCOPY SHOULDER ROTATOR CUFF REPAIR;  Surgeon: Chayito Payton MD;  Location: [facility] OR     ARTHROSCOPY SHOULDER, OPEN BICEP TENODESIS REPAIR, COMBINED Left 3/8/2016    Procedure: COMBINED ARTHROSCOPY SHOULDER, OPEN BICEP TENODESIS REPAIR;  Surgeon: Chayito Payton MD;  Location: [facility] OR     BACK SURGERY      Fusion     ENT SURGERY      T & A     SEPTOPLASTY       THORACIC SURGERY        No Known Allergies   Social History     Tobacco Use     Smoking status: Former Smoker     Packs/day: 1.00     Types: Cigarettes     Quit date: 2011     Years since quitting: 10.9     Smokeless tobacco: Never Used   Substance Use Topics     Alcohol use: Yes      Wt Readings from Last 1 Encounters:   22 129.3 kg (285 lb 0.9 oz)        Anesthesia Evaluation   Pt has had prior anesthetic. Type: General.    History of anesthetic complications   Sleep apnea.    ROS/MED HX  ENT/Pulmonary: Comment: Uses BIPAP at night    (+) sleep apnea, uses CPAP, tobacco use, Past use,     Neurologic:  - neg neurologic ROS     Cardiovascular:     (+) hypertension-----    METS/Exercise Tolerance:     Hematologic:  - neg hematologic  ROS     Musculoskeletal:  - neg musculoskeletal ROS     GI/Hepatic:  - neg GI/hepatic ROS     Renal/Genitourinary:  - neg Renal ROS "     Endo:     (+) type II DM, Obesity,     Psychiatric/Substance Use:  - neg psychiatric ROS     Infectious Disease:  - neg infectious disease ROS     Malignancy:  - neg malignancy ROS     Other:  - neg other ROS          Physical Exam    Airway  airway exam normal           Respiratory Devices and Support         Dental  no notable dental history         Cardiovascular   cardiovascular exam normal          Pulmonary   pulmonary exam normal                OUTSIDE LABS:  CBC:   Lab Results   Component Value Date    HGB 16.5 06/23/2014     BMP:   Lab Results   Component Value Date     03/08/2016     (L) 03/08/2016    POTASSIUM 4.2 03/08/2016    POTASSIUM 6.7 (HH) 03/08/2016    CHLORIDE 100 03/08/2016    CHLORIDE 100 03/08/2016    CO2 25 03/08/2016    CO2 24 03/08/2016    BUN 19 03/08/2016    BUN 20 03/08/2016    CR 0.77 03/08/2016    CR 0.74 03/08/2016    GLC 91 03/08/2016    GLC 93 03/08/2016     COAGS: No results found for: PTT, INR, FIBR  POC:   Lab Results   Component Value Date     (H) 03/08/2016     HEPATIC:   Lab Results   Component Value Date    ALT 67 06/23/2014     OTHER:   Lab Results   Component Value Date    A1C 5.8 06/23/2014    GEREMIAS 9.5 03/08/2016    TSH 1.64 06/23/2014    T4 0.72 06/23/2014       Anesthesia Plan    ASA Status:  3   NPO Status:  NPO Appropriate    Anesthesia Type: General.     - Airway: ETT   Induction: Intravenous, Propofol.   Maintenance: Balanced.        Consents    Anesthesia Plan(s) and associated risks, benefits, and realistic alternatives discussed. Questions answered and patient/representative(s) expressed understanding.    - Discussed:     - Discussed with:  Patient, Spouse      - Extended Intubation/Ventilatory Support Discussed: No.      - Patient is DNR/DNI Status: No    Use of blood products discussed: No .     Postoperative Care    Pain management: IV analgesics, Oral pain medications, Peripheral nerve block (Single Shot).   PONV prophylaxis: Ondansetron  (or other 5HT-3)     Comments:                Chace Butcher MD

## 2022-06-02 NOTE — BRIEF OP NOTE
[facility] Brief Operative Note    Pre-operative diagnosis: Incomplete tear of left rotator cuff, unspecified whether traumatic [M75.112]   Post-operative diagnosis 75% high grade partial thickness supra tear   Procedure: Procedure(s):  Left arthroscopic rotator cuff repair, subacromial bursectomy   Surgeon(s): Surgeon(s) and Role:     * Chayito Payton MD - Primary   Estimated blood loss: 5 mL    Specimens: * No specimens in log *   Findings: As above  2.6 Fibertak DR x 2; 4.75 SwiveLock x 2

## 2022-06-02 NOTE — DISCHARGE INSTRUCTIONS
"    Information about liposomal bupivacaine (Exparel)    What is Liposomal Bupivacaine?    Liposomal Bupivacaine is a numbing medication that can help you manage your pain after surgery.  This medication is similar to \"novacaine,\" which is often used by the dentist.  Liposomal bupivacaine is released slowly and can help control pain for up to 72 hours.    What is the purpose of Liposomal Bupivacaine?  To manage your pain after surgery  To help you sleep better, take deep breaths, walk more comfortable, and feel up to visiting with others    How is the procedure done?  Liposomal bupivacaine is a medication given by an injection.  It is usually given right before your surgery.  If this is the case, you will be awake or sedated, but you should experience minimal pain during the procedure.  For some people, the injection may be given at the very end of your surgery.  It all depends on the type of surgery and your situation.  The procedure usually takes about 5-15 minutes.  An ultrasound machine will help the anesthesiologist insert it in the right place or the surgeon will inject it under direct vision.   A needle is used to place the numbing medication under your skin.  It provides pain relief by numbing the tissue in the area where your surgeon will make the incision.    What can I expect?  You may experience numbness, tingling, or a feeling of heaviness around the area that was injected.  If you experience any of the follow symptoms IMMEDIATELY CALL THE REGIONAL ANESTHESIA PAIN SERVICE:  Numbness or tingling occurs in areas other than around the injection site  Blurry vision  Ringing in your ears  A metallic taste in your mouth    CALL the Regional Anesthesia Pain Service at:  [phone].  Press \"4\" for the  and let the hospital  know that you are having a problem with a nerve block and that you would like to page the \"adult care inpatient pain management/[facility] team\".  From 7 am - 5 pm, " "page the \"staff\" physician  From 5 pm - 7 am, page the \"resident\" physician (if no response from \"resident\" physician, call the  back and the \"staff\" physician).    You should not receive any other type of numbing medication within 4 days after receiving liposomal bupivacaine unless your anesthesiologist approves.  Post Operative Instructions: Regional Anesthetic for Upper Extremity with Liposomal Bupivacaine  General Information:   Regional anesthesia is when local anesthetic or  numbing  medication is injected around the nerves to anesthetize or  numb  the area supplied by that set of nerves. It is a type of analgesia used to control pain and decreases the need for narcotics following surgery.    Types of Regional Blocks:  Interscalene: A block injected into the neck on the operative shoulder/arm of a patient having shoulder surgery  Supraclavicular: A block injected near the clavicle on the operative shoulder of a patient having elbow, forearm, or hand surgery    Procedure:  The type of anesthesia your doctor used to numb your shoulder or arm will usually not start to wear off for 24-48 hours, but may last as long as 72 hours. You should be careful during that period, since it is possible to injure your arm without being aware of the injury. While your arm is numb, you should:  Avoid striking or bumping your arm  Avoid extreme hot or cold    Diet:  There are no restrictions on your diet. You should drink plenty of fluids.     Discomfort:  You will have a tingling and prickly sensation in your arm as the feeling begins to return. You can also expect some discomfort. The amount of discomfort is unpredictable, but if you have more pain than can be controlled with pain medication you should notify your physician.     Pain Medications:  Begin taking your oral pain pills before bedtime and during the night to avoid a sudden onset of pain as part of the block wears off.  Do not engage in drinking, driving, or " hazardous occupations while taking pain medication.     Stitches:   You may have stitches or special skin closures. You doctor will inform you when to return to the office to have them removed.     Activity:  On the day of surgery you should try to stay in bed with your hand elevated on pillows. You may resume your normal activity after that, wearing a sling for comfort. Contact your physician if you have any of the problems:   Continued numbness or tingling in the arm or hand after 72 hours  Swelling of the fingers or fingers that are cold to the touch  Excessive bleeding or drainage  Severe pain   [facility]  Same-Day Surgery   Adult Discharge Orders & Instructions   For 24 hours after surgery  Get plenty of rest.  A responsible adult must stay with you for at least 24 hours after you leave the hospital.   Do not drive or use heavy equipment.  If you have weakness or tingling, don't drive or use heavy equipment until this feeling goes away.  Do not drink alcohol.  Avoid strenuous or risky activities.  Ask for help when climbing stairs.   You may feel lightheaded.  IF so, sit for a few minutes before standing.  Have someone help you get up.   If you have nausea (feel sick to your stomach): Drink only clear liquids such as apple juice, ginger ale, broth or 7-Up.  Rest may also help.  Be sure to drink enough fluids.  Move to a regular diet as you feel able.  You may have a slight fever. Call the doctor if your fever is over 100 F (37.7 C) (taken under the tongue) or lasts longer than 24 hours.  You may have a dry mouth, a sore throat, muscle aches or trouble sleeping.  These should go away after 24 hours.  Do not make important or legal decisions.   Call your doctor for any of the followin.  Signs of infection (fever, growing tenderness at the surgery site, a large amount of drainage or bleeding, severe pain, foul-smelling drainage, redness, swelling).  2. It has been over 8 to 10 hours  since surgery and you are still not able to urinate (pass water).  Tylenol given at 8:20 AM. Next available at 2:20 PM. Max dose of tylenol in 24 hours is 4,000 mg.

## 2022-06-07 ENCOUNTER — MYC MEDICAL ADVICE (OUTPATIENT)
Dept: ORTHOPEDICS | Facility: CLINIC | Age: 62
End: 2022-06-07
Payer: COMMERCIAL

## 2022-06-07 DIAGNOSIS — Z47.89 ORTHOPEDIC AFTERCARE: Primary | ICD-10-CM

## 2022-06-09 DIAGNOSIS — M75.112 INCOMPLETE TEAR OF LEFT ROTATOR CUFF, UNSPECIFIED WHETHER TRAUMATIC: Primary | ICD-10-CM

## 2022-06-09 NOTE — OP NOTE
DATE OF PROCEDURE: 6/2/2022     PREOPERATIVE DIAGNOSES:   1. Left rotator cuff tear.       POSTOPERATIVE DIAGNOSES:   1. Left rotator cuff tear, high grade partial thickness supraspinatus       PROCEDURES:   1. Left arthroscopic rotator cuff repair.   2. Left subacromial bursectomy without bony acromioplasty.     STAFF SURGEON: Chayito Payton MD   ASSISTANT: Mark Anthony Greenfield MD    ANESTHESIA: General endotracheal anesthesia with supplementary interscalene block.   ESTIMATED BLOOD LOSS: 5 mL.     IMPLANTS: Arthrex 2.6 DR x 2; 4.75 SwiveLock x 2  COMPLICATIONS: None.     BRIEF PATIENT HISTORY: Osbaldo Valerio is a 61 year old male I have seen in clinic. Please refer to that documentation. He has an MRI which demonstrated a near full thickness tear involving the supraspinatus. The patient has had nonoperative management including physical therapy and prior corticosteroid injection. He has not had adequate lasting relief of pain and is at this time having lifestyle limiting symptoms. He wishes at this time to proceed with surgical intervention. We had discussed the risks and benefits of both non-operative and surgical management. We discussed the expected postoperative restrictions. We discussed the risks of surgery including failure of the cuff to heal or risk of retear, risk of being no better or even worse if he  became stiff, infected, had an injury to the nerves or arteries which power the arm or hand or had a reaction to anesthesia. We discussed the postoperative rehabilitation course. The patient wished to proceed with surgery and informed consent was completed.    DESCRIPTION OF PROCEDURE: The patient was identified in the preoperative area and the correct left shoulder was marked for surgery. The patient was provided an interscalene block by our Anesthesia colleagues and was taken to the operating room where he was surrendered to general endotracheal anesthesia. The patient was moved to the operating  table in the beachchair position with all bony prominences well padded. The head was placed in a head rodríguez with the neck in neutral position. The left upper extremity was prepped and draped in the usual sterile fashion. A timeout was held in accordance with hospital policy, confirming correct patient, side, site, procedure and administration of IV antibiotics prior to incision.   I initiated shoulder arthroscopy through a posterior viewing portal. I created an anterior working portal under direct visualization. I performed a diagnostic arthroscopy. The long head of the biceps was absent from the shoulder. There was longitudinal splitting of the subscapularis without high grade partial or full thickness tearing. There was tearing of the supraspinatus. The infraspinatus and teres were intact. There were no loose bodies in the axillary pouch. The chondral surfaces were relatively well preserved.   I then moved to the subacromial space and performed a thorough subacromial bursectomy. Upon entering the subacromial space, moderate bursitis was encountered. I debrided this with a shaver and an ablator. I denuded the undersurface of all soft tissues including the CA ligament.   I examined the bursal side of the cuff and it was clear based on the thin nature of the tendon where the near full thickness supra tear was. I debrided the tendon to complete the tear and I debrided the greater tuberosity at the site of the tear. I placed 2 medial 2.6 Fibertak DR anchors. I passed the the repair stitch from the anterior anchor through the posterior anchor and vice versa. All sutures were then brought laterally to 2 more 4.75 Bio-SwiveLock anchors. This nicely reapproximated the tendon to the greater tuberosity.     All instruments were removed from the shoulder and then portals were closed with interrupted 3-0 Monocryl. Steri-Strips and a soft sterile dressing were applied. The arm was placed into an abduction sling and the patient  was extubated and transported to the recovery room in stable condition. There were no apparent intraoperative complications.     POSTOPERATIVE PLAN:   1. The patient will be discharged to home when he meets Same Day discharge criteria.   2. The patient will have no range of motion of the shoulder for 3-4 weeks and can do active hand, wrist and elbow range of motion.   3. At 3-4 weeks, the patient will start passive and active assisted range of motion. active range of motion over the following 6 weeks with strengthening at 3 months.     LAILT WADE MD

## 2022-06-13 ENCOUNTER — OFFICE VISIT (OUTPATIENT)
Dept: ORTHOPEDICS | Facility: CLINIC | Age: 62
End: 2022-06-13
Payer: COMMERCIAL

## 2022-06-13 DIAGNOSIS — M75.112 INCOMPLETE TEAR OF LEFT ROTATOR CUFF, UNSPECIFIED WHETHER TRAUMATIC: Primary | ICD-10-CM

## 2022-06-13 PROCEDURE — 99024 POSTOP FOLLOW-UP VISIT: CPT | Performed by: ORTHOPAEDIC SURGERY

## 2022-06-13 NOTE — LETTER
6/13/2022         RE: Osbaldo Valerio  [address] [facility] [address]        Dear Colleague,    Thank you for referring your patient, Osbaldo Valerio, to the [facility]. Please see a copy of my visit note below.    CHIEF CONCERN: Status post left arthroscopic rotator cuff repair, subacromial bursectomy  DATE OF SURGERY: 6/2/2022    HISTORY OF PRESENT ILLNESS: Osbaldo is a pleasant 61 year-old male who is 2 weeks status post the above procedure.  Other than the irritation of his sling he is doing well without concerns    EXAM:  Pleasant adult male in NAD.  He is seen with his wife today  Respirations even and unlabored.  Left upper extremity: Incisions clean, dry, and intact. Distally neurovascularly intact without deficits. Shoulder range of motion not tested due to postop restrictions.    ASSESSMENT:  1.  2 weeks status post above procedure    PLAN:    Arthroscopic images and intra-operative findings reviewed    Range of Motion:     Hand, wrist and elbow ROM    Shoulder range of motion to begin per op note    Sling: On at all times except for bathing or dressing    Pain medication: Reviewed. NSAIDs OK.     Follow up: 4 weeks with no new radiographs needed.         Again, thank you for allowing me to participate in the care of your patient.        Sincerely,        Chayito Payton MD

## 2022-06-13 NOTE — NURSING NOTE
Reason For Visit:   Chief Complaint   Patient presents with     Surgical Followup     2 wks s/p left arthroscopic rotator cuff repair, subacromial bursectomy DOS: 6/2/22       PCP: Mick Varela  Ref: Dr. Cottrell     ?  No  Occupation .  Currently working? Yes.  Work status?  Full time.  Date of surgery: s/p left extensive glenohumeral; left shoulder open long head biceps tenodesis DOS: 3/8/2016; s/p left arthroscopic rotator cuff repair, subacromial bursectomy DOS: 6/2/22        Right hand dominant    There were no vitals taken for this visit.    Brenda Garza, ATC

## 2022-06-13 NOTE — PROGRESS NOTES
CHIEF CONCERN: Status post left arthroscopic rotator cuff repair, subacromial bursectomy  DATE OF SURGERY: 6/2/2022    HISTORY OF PRESENT ILLNESS: Osbaldo is a pleasant 61 year-old male who is 2 weeks status post the above procedure.  Other than the irritation of his sling he is doing well without concerns    EXAM:  Pleasant adult male in NAD.  He is seen with his wife today  Respirations even and unlabored.  Left upper extremity: Incisions clean, dry, and intact. Distally neurovascularly intact without deficits. Shoulder range of motion not tested due to postop restrictions.    ASSESSMENT:  1.  2 weeks status post above procedure    PLAN:    Arthroscopic images and intra-operative findings reviewed    Range of Motion:     Hand, wrist and elbow ROM    Shoulder range of motion to begin per op note    Sling: On at all times except for bathing or dressing    Pain medication: Reviewed. NSAIDs OK.     Follow up: 4 weeks with no new radiographs needed.

## 2022-07-14 ENCOUNTER — OFFICE VISIT (OUTPATIENT)
Dept: ORTHOPEDICS | Facility: CLINIC | Age: 62
End: 2022-07-14
Payer: COMMERCIAL

## 2022-07-14 DIAGNOSIS — M75.112 INCOMPLETE TEAR OF LEFT ROTATOR CUFF, UNSPECIFIED WHETHER TRAUMATIC: Primary | ICD-10-CM

## 2022-07-14 PROCEDURE — 99024 POSTOP FOLLOW-UP VISIT: CPT | Mod: GC | Performed by: ORTHOPAEDIC SURGERY

## 2022-07-14 NOTE — NURSING NOTE
Reason For Visit:   Chief Complaint   Patient presents with     Surgical Followup     6 wks s/p left arthroscopic rotator cuff repair, subacromial bursectomy DOS: 6/2/22 - having good days and bad days          PCP: Mick Varela  Ref: Dr. Cottrell     ?  No  Occupation .  Currently working? Yes.  Work status?  Full time.  Date of surgery: s/p left extensive glenohumeral; left shoulder open long head biceps tenodesis DOS: 3/8/2016; s/p left arthroscopic rotator cuff repair, subacromial bursectomy DOS: 6/2/22        Right hand dominant     SANE score  Affected shoulder: left  Right shoulder SANE: 80  Left shoulder SANE: 65    There were no vitals taken for this visit.    Brenda Garza, ATC

## 2022-07-14 NOTE — LETTER
7/14/2022         RE: Osbaldo Valerio  [address] [facility] [address]        Dear Colleague,    Thank you for referring your patient, Osbaldo Valerio, to the [facility]. Please see a copy of my visit note below.    CHIEF CONCERN:  6 weeks status post left arthroscopic rotator cuff repair, subacromial bursectomy  DOS: 6/2/2022    HISTORY OF PRESENT ILLNESS:  Osbalod Valerio is a gentleman 6 weeks status post the above-stated procedure.  Postoperative plan was for passive/active assisted ROM at 3-4 weeks postop.      Today returns accompanied by wife.  States that he has some good days and bad days.  5-6/10 pain today, 9/10 at its worst.  Pain localized to the base of the neck and anterior left shoulder.  Taking ibuprofen as needed for pain.  Unable to utilize sling secondary to aggravation of neck pain.  Compliant with nonweightbearing restriction and no active shoulder range of motion.  Attending weekly physical therapy.  No numbness, tingling.  No fevers, chills, incisional complications.    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 Articulates and communicates with normal affect.  Pulm: Respirations even and unlabored on room air  CV: Regular rate via peripheral pulse, extremities wwp  MSK: Focused upper extremity exam:   LUE:  - Well-healed incisions without periincisional erythema or drainage  - Shoulder passive motion: FE to 170, Abd to 150, ER at side to 45, IR deferred.  - Rotator cuff strength testing deferred  - Fires deltoid, EPL, FPL, and Intrinsics.  - Sensation intact all dermatomes into the hand to light touch.     IMAGING:  No new imaging today    ASSESSMENT:  1.  6 weeks status post left arthroscopic rotator cuff repair, subacromial bursectomy    PLAN:  Counseled patient he is progressing well postoperatively.  Expectant aggravation of pain with  repetitive motions about the shoulder this short duration out from surgery.  Okay to do light activity by waist; recommend up to 1 hour at most due to potential to aggravate shoulder pain.  Flareups likely with contribution of cervical spine; follow-up next week with Dr. Cottrell already established.  Recommend holding on utilizing the meat saw until after next evaluation in an additional 6 weeks.      Range of Motion: Progress ROM of the shoulder with physical therapy per operative note - ok to progress to active assisted range of motion. He will progress to active range of motion at 9 weeks with formal cuff strengthening at 3 months.    Sling: Wean from sling at this time.     Pain medication: NSAIDs and Tylenol PRN    Work: Return to work reviewed    Follow up: 6 weeks with no new radiographs needed. Plan to advance to strengthening at 3 months pending clinical follow up.       Colt Mauricio MD  Orthopaedic Surgery PGY-4    Patient seen and discussed with Dr. Payton who agrees with aforementioned plan.    I have personally examined this patient and have reviewed the clinical presentation and progress note with the resident.  I agree with the treatment plan as outlined.  The plan was formulated with the resident on the day of the resident's note.         Again, thank you for allowing me to participate in the care of your patient.        Sincerely,        Chayito Payton MD

## 2022-07-14 NOTE — PROGRESS NOTES
CHIEF CONCERN:  6 weeks status post left arthroscopic rotator cuff repair, subacromial bursectomy  DOS: 6/2/2022    HISTORY OF PRESENT ILLNESS:  Osbaldo Valerio is a gentleman 6 weeks status post the above-stated procedure.  Postoperative plan was for passive/active assisted ROM at 3-4 weeks postop.      Today returns accompanied by wife.  States that he has some good days and bad days.  5-6/10 pain today, 9/10 at its worst.  Pain localized to the base of the neck and anterior left shoulder.  Taking ibuprofen as needed for pain.  Unable to utilize sling secondary to aggravation of neck pain.  Compliant with nonweightbearing restriction and no active shoulder range of motion.  Attending weekly physical therapy.  No numbness, tingling.  No fevers, chills, incisional complications.    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 Articulates and communicates with normal affect.  Pulm: Respirations even and unlabored on room air  CV: Regular rate via peripheral pulse, extremities wwp  MSK: Focused upper extremity exam:   LUE:  - Well-healed incisions without periincisional erythema or drainage  - Shoulder passive motion: FE to 170, Abd to 150, ER at side to 45, IR deferred.  - Rotator cuff strength testing deferred  - Fires deltoid, EPL, FPL, and Intrinsics.  - Sensation intact all dermatomes into the hand to light touch.     IMAGING:  No new imaging today    ASSESSMENT:  1.  6 weeks status post left arthroscopic rotator cuff repair, subacromial bursectomy    PLAN:  Counseled patient he is progressing well postoperatively.  Expectant aggravation of pain with repetitive motions about the shoulder this short duration out from surgery.  Okay to do light activity by waist; recommend up to 1 hour at most due to potential to aggravate shoulder pain.  Flareups likely with contribution of cervical spine; follow-up next week  with Dr. Cottrell already established.  Recommend holding on utilizing the meat saw until after next evaluation in an additional 6 weeks.      Range of Motion: Progress ROM of the shoulder with physical therapy per operative note - ok to progress to active assisted range of motion. He will progress to active range of motion at 9 weeks with formal cuff strengthening at 3 months.    Sling: Wean from sling at this time.     Pain medication: NSAIDs and Tylenol PRN    Work: Return to work reviewed    Follow up: 6 weeks with no new radiographs needed. Plan to advance to strengthening at 3 months pending clinical follow up.       Colt Mauricio MD  Orthopaedic Surgery PGY-4    Patient seen and discussed with Dr. Payton who agrees with aforementioned plan.    I have personally examined this patient and have reviewed the clinical presentation and progress note with the resident.  I agree with the treatment plan as outlined.  The plan was formulated with the resident on the day of the resident's note.

## 2022-07-18 ENCOUNTER — OFFICE VISIT (OUTPATIENT)
Dept: ORTHOPEDICS | Facility: CLINIC | Age: 62
End: 2022-07-18
Payer: COMMERCIAL

## 2022-07-18 DIAGNOSIS — M50.30 DEGENERATIVE DISC DISEASE, CERVICAL: Primary | ICD-10-CM

## 2022-07-18 DIAGNOSIS — G89.29 CHRONIC NECK PAIN: ICD-10-CM

## 2022-07-18 DIAGNOSIS — M50.20 CERVICAL DISC HERNIATION: ICD-10-CM

## 2022-07-18 DIAGNOSIS — M54.2 CHRONIC NECK PAIN: ICD-10-CM

## 2022-07-18 PROCEDURE — 99214 OFFICE O/P EST MOD 30 MIN: CPT | Mod: 24 | Performed by: PREVENTIVE MEDICINE

## 2022-07-18 RX ORDER — CYCLOBENZAPRINE HCL 10 MG
10 TABLET ORAL
Qty: 30 TABLET | Refills: 1 | Status: SHIPPED | OUTPATIENT
Start: 2022-07-18

## 2022-07-18 RX ORDER — LIDOCAINE 50 MG/G
1 PATCH TOPICAL EVERY 24 HOURS
Qty: 15 PATCH | Refills: 3 | Status: SHIPPED | OUTPATIENT
Start: 2022-07-18

## 2022-07-18 NOTE — TELEPHONE ENCOUNTER
SPINE PATIENTS - NEW PROTOCOL PREVISIT    RECORDS RECEIVED FROM: Internal   REASON FOR VISIT: CERVICAL SPINE   Date of Appt: 08/05/2022   NOTES (FOR ALL VISITS) STATUS DETAILS   OFFICE NOTE from referring provider Internal 07/18/2022 Dr Cottrell [facility]    OFFICE NOTE from other specialist N/A    DISCHARGE SUMMARY from hospital N/A    DISCHARGE REPORT from ER N/A    EMG REPORT N/A    MEDICATION LIST N/A    IMAGING  (FOR ALL VISITS)     MRI (HEAD, NECK, SPINE) Internal 02/21/2022 cervical spine   XRAY (SPINE) *NEUROSURGERY* Internal 03/22/2022 cervical thoracic spine   CT (HEAD, NECK, SPINE) N/A

## 2022-07-18 NOTE — PROGRESS NOTES
HISTORY OF PRESENT ILLNESS  Mr. Valerio is a pleasant 61 year old year old male who presents to clinic today with   Osbaldo explains that he has had a cervical injection, and an MRI of his neck. He states that he just wants to get his neck fixed.  We have attempted a cervical injection in March this year that only improved his neck symptoms for a few days  Wants to review his MRI and discuss options  Location: cervical spine  Quality:  achy pain    Severity: 7/10 at worst      MEDICAL HISTORY  Patient Active Problem List   Diagnosis     Abnormal glucose     Morbid obesity (H)     Fatigue     Incomplete tear of left rotator cuff, unspecified whether traumatic       Current Outpatient Medications   Medication Sig Dispense Refill     acetaminophen (TYLENOL) 325 MG tablet Take 2 tablets (650 mg) by mouth every 4 hours as needed for mild pain 50 tablet 0     albuterol (PROAIR HFA/PROVENTIL HFA/VENTOLIN HFA) 108 (90 Base) MCG/ACT inhaler INHALE TWO PUFFS BY MOUTH EVERY 4 HOURS AS NEEDED       amLODIPine (NORVASC) 10 MG tablet Take 10 mg by mouth every evening        aspirin 81 MG tablet Take by mouth daily       diazepam (VALIUM) 5 MG tablet Take 1 tablet (5 mg) by mouth once as needed for anxiety Take 30 minutes prior to MRI. May repeat once as needed. 2 tablet 0     furosemide (LASIX) 20 MG tablet Take 40 mg by mouth daily       ibuprofen (ADVIL/MOTRIN) 600 MG tablet Take 1 tablet (600 mg) by mouth every 6 hours as needed for moderate pain 40 tablet 0     lisinopril-hydrochlorothiazide (PRINZIDE,ZESTORETIC) 20-25 MG per tablet Take 1 tablet by mouth daily Holding am of surgery       LORazepam (ATIVAN) 1 MG tablet Take 1-2 tablets (1-2 mg) by mouth once as needed for anxiety (take 1-2 tablets within 30 minutes prior to cervical injection for anxiety) 2 tablet 0     LORazepam (ATIVAN) 1 MG tablet Take 1 tablet (1 mg) by mouth once as needed for anxiety (take 30 minutes prior to MRI) (Patient not taking: Reported on  3/7/2022) 1 tablet 0     metFORMIN (GLUCOPHAGE) 500 MG tablet Take 500 mg by mouth 2 times daily (with meals) Hold day of surgery       methocarbamol (ROBAXIN) 500 MG tablet Take 1-2 tablets (500-1,000 mg) by mouth nightly as needed for muscle spasms 60 tablet 0     methocarbamol (ROBAXIN) 500 MG tablet Take 1 tablet (500 mg) by mouth 4 times daily as needed for muscle spasms 90 tablet 1     Multiple Vitamins-Minerals (MULTIVITAMIN OR) Take 1 tablet by mouth daily       Omega-3 Fatty Acids (OMEGA-3 FISH OIL PO) Take 1 g by mouth daily       oxyCODONE (ROXICODONE) 5 MG tablet Take 1-2 tablets (5-10 mg) by mouth every 4 hours as needed for moderate to severe pain 20 tablet 0     potassium chloride SA (K-DUR,KLOR-CON M) 10 MEQ tablet Take 10 mEq by mouth daily       predniSONE (DELTASONE) 20 MG tablet Take 2 tablets (40 mg) by mouth daily (Patient not taking: Reported on 3/7/2022) 14 tablet 0     predniSONE (DELTASONE) 20 MG tablet Take 2 tablets (40 mg) by mouth daily (Patient not taking: Reported on 3/7/2022) 12 tablet 0     senna-docusate (SENOKOT-S/PERICOLACE) 8.6-50 MG tablet Take 1-2 tablets by mouth 2 times daily 30 tablet 0     simvastatin (ZOCOR) 40 MG tablet Take 20 mg by mouth At Bedtime       TESTOSTERONE 2MG Inject as directed every 14 days       UNABLE TO FIND MEDICATION NAME: Super Enzymes Vitamin       venlafaxine (EFFEXOR) 75 MG tablet Take 75 mg by mouth 3 times daily       VITAMIN D, CHOLECALCIFEROL, PO Take 1,000 Units by mouth daily (Patient not taking: Reported on 3/7/2022)         No Known Allergies    Family History   Problem Relation Age of Onset     Diabetes Mother         Type II     Heart Disease Father         Quad bypass     Social History     Socioeconomic History     Marital status:    Tobacco Use     Smoking status: Former Smoker     Packs/day: 1.00     Types: Cigarettes     Quit date: 2011     Years since quittin.0     Smokeless tobacco: Never Used   Substance and  Sexual Activity     Alcohol use: Yes     Drug use: No     Sexual activity: Yes     Partners: Female       Additional medical/Social/Surgical histories reviewed in [facility] and updated as appropriate.     REVIEW OF SYSTEMS (7/18/2022)  10 point ROS of systems including Constitutional, Eyes, Respiratory, Cardiovascular, Gastroenterology, Genitourinary, Integumentary, Musculoskeletal, Psychiatric, Allergic/Immunologic were all negative except for pertinent positives noted in my HPI.     PHYSICAL EXAM  VSS  General  - normal appearance, in no obvious distress  HEENT  - conjunctivae not injected, moist mucous membranes, normocephalic/atraumatic head, ears normal appearance, no lesions, mouth normal appearance, no scars, normal dentition and teeth present  CV  - normal peripheral perfusion  Pulm  - normal respiratory pattern, non-labored    Musculoskeletal - CERVICAL SPINE  - stance and posture: normal gait without limp, no obvious leg length discrepancy, normal heel and toe walk, normal balance, slightly forward shoulders, head balanced normally on trunk  - inspection: normal bone and joint alignment, no obvious kyphosis  - palpation: no paravertebral or bony tenderness, except at base of neck and trapezius muscles  - ROM: normal and painless flexion, extension, left and right sidebending and rotation with some discomfort  - strength: upper extremities 5/5 in all planes  - special tests:  (+) spurlings    Neuro  - biceps, triceps, supinator DTRs 2+ bilaterally, no sensory or motor deficit, grossly normal coordination, normal muscle tone  Skin  - no ecchymosis, erythema, warmth, or induration, no obvious rash  Psych  - interactive, appropriate, normal mood and affect  ASSESSMENT & PLAN  62 yo male with cervical ddd, disc herniations, radicular pain, worse    I independently reviewed the following imaging studies:  Cervical MRI: shows ddd, disc herniations  Discussed considering MARIANA, he doesn't want to do this, discussed pain  clinic referral, and he wants to see Dr Cardona to discuss surgical options  He continues to have daily pain in his neck with flareups into shoulders  RX given for flexeril and lidocaine patches    Appropriate PPE was utilized for prevention of spread of Covid-19.  Chace Cottrell MD, [facility]

## 2022-07-18 NOTE — LETTER
7/18/2022         RE: Osbaldo Valerio  [address] [facility] [address]        Dear Colleague,    Thank you for referring your patient, Osbaldo Valerio, to the [facility] [place]. Please see a copy of my visit note below.    HISTORY OF PRESENT ILLNESS  Mr. Valerio is a pleasant 61 year old year old male who presents to clinic today with   Osbaldo explains that he has had a cervical injection, and an MRI of his neck. He states that he just wants to get his neck fixed.  We have attempted a cervical injection in March this year that only improved his neck symptoms for a few days  Wants to review his MRI and discuss options  Location: cervical spine  Quality:  achy pain    Severity: 7/10 at worst      MEDICAL HISTORY  Patient Active Problem List   Diagnosis     Abnormal glucose     Morbid obesity (H)     Fatigue     Incomplete tear of left rotator cuff, unspecified whether traumatic       Current Outpatient Medications   Medication Sig Dispense Refill     acetaminophen (TYLENOL) 325 MG tablet Take 2 tablets (650 mg) by mouth every 4 hours as needed for mild pain 50 tablet 0     albuterol (PROAIR HFA/PROVENTIL HFA/VENTOLIN HFA) 108 (90 Base) MCG/ACT inhaler INHALE TWO PUFFS BY MOUTH EVERY 4 HOURS AS NEEDED       amLODIPine (NORVASC) 10 MG tablet Take 10 mg by mouth every evening        aspirin 81 MG tablet Take by mouth daily       diazepam (VALIUM) 5 MG tablet Take 1 tablet (5 mg) by mouth once as needed for anxiety Take 30 minutes prior to MRI. May repeat once as needed. 2 tablet 0     furosemide (LASIX) 20 MG tablet Take 40 mg by mouth daily       ibuprofen (ADVIL/MOTRIN) 600 MG tablet Take 1 tablet (600 mg) by mouth every 6 hours as needed for moderate pain 40 tablet 0     lisinopril-hydrochlorothiazide (PRINZIDE,ZESTORETIC) 20-25 MG per tablet Take 1 tablet by mouth daily Holding am of surgery       LORazepam (ATIVAN) 1 MG tablet Take 1-2 tablets (1-2 mg) by  mouth once as needed for anxiety (take 1-2 tablets within 30 minutes prior to cervical injection for anxiety) 2 tablet 0     LORazepam (ATIVAN) 1 MG tablet Take 1 tablet (1 mg) by mouth once as needed for anxiety (take 30 minutes prior to MRI) (Patient not taking: Reported on 3/7/2022) 1 tablet 0     metFORMIN (GLUCOPHAGE) 500 MG tablet Take 500 mg by mouth 2 times daily (with meals) Hold day of surgery       methocarbamol (ROBAXIN) 500 MG tablet Take 1-2 tablets (500-1,000 mg) by mouth nightly as needed for muscle spasms 60 tablet 0     methocarbamol (ROBAXIN) 500 MG tablet Take 1 tablet (500 mg) by mouth 4 times daily as needed for muscle spasms 90 tablet 1     Multiple Vitamins-Minerals (MULTIVITAMIN OR) Take 1 tablet by mouth daily       Omega-3 Fatty Acids (OMEGA-3 FISH OIL PO) Take 1 g by mouth daily       oxyCODONE (ROXICODONE) 5 MG tablet Take 1-2 tablets (5-10 mg) by mouth every 4 hours as needed for moderate to severe pain 20 tablet 0     potassium chloride SA (K-DUR,KLOR-CON M) 10 MEQ tablet Take 10 mEq by mouth daily       predniSONE (DELTASONE) 20 MG tablet Take 2 tablets (40 mg) by mouth daily (Patient not taking: Reported on 3/7/2022) 14 tablet 0     predniSONE (DELTASONE) 20 MG tablet Take 2 tablets (40 mg) by mouth daily (Patient not taking: Reported on 3/7/2022) 12 tablet 0     senna-docusate (SENOKOT-S/PERICOLACE) 8.6-50 MG tablet Take 1-2 tablets by mouth 2 times daily 30 tablet 0     simvastatin (ZOCOR) 40 MG tablet Take 20 mg by mouth At Bedtime       TESTOSTERONE 2MG Inject as directed every 14 days       UNABLE TO FIND MEDICATION NAME: Super Enzymes Vitamin       venlafaxine (EFFEXOR) 75 MG tablet Take 75 mg by mouth 3 times daily       VITAMIN D, CHOLECALCIFEROL, PO Take 1,000 Units by mouth daily (Patient not taking: Reported on 3/7/2022)         No Known Allergies    Family History   Problem Relation Age of Onset     Diabetes Mother         Type II     Heart Disease Father         Quad  bypass     Social History     Socioeconomic History     Marital status:    Tobacco Use     Smoking status: Former Smoker     Packs/day: 1.00     Types: Cigarettes     Quit date: 2011     Years since quittin.0     Smokeless tobacco: Never Used   Substance and Sexual Activity     Alcohol use: Yes     Drug use: No     Sexual activity: Yes     Partners: Female       Additional medical/Social/Surgical histories reviewed in [facility] and updated as appropriate.     REVIEW OF SYSTEMS (2022)  10 point ROS of systems including Constitutional, Eyes, Respiratory, Cardiovascular, Gastroenterology, Genitourinary, Integumentary, Musculoskeletal, Psychiatric, Allergic/Immunologic were all negative except for pertinent positives noted in my HPI.     PHYSICAL EXAM  VSS  General  - normal appearance, in no obvious distress  HEENT  - conjunctivae not injected, moist mucous membranes, normocephalic/atraumatic head, ears normal appearance, no lesions, mouth normal appearance, no scars, normal dentition and teeth present  CV  - normal peripheral perfusion  Pulm  - normal respiratory pattern, non-labored    Musculoskeletal - CERVICAL SPINE  - stance and posture: normal gait without limp, no obvious leg length discrepancy, normal heel and toe walk, normal balance, slightly forward shoulders, head balanced normally on trunk  - inspection: normal bone and joint alignment, no obvious kyphosis  - palpation: no paravertebral or bony tenderness, except at base of neck and trapezius muscles  - ROM: normal and painless flexion, extension, left and right sidebending and rotation with some discomfort  - strength: upper extremities 5/5 in all planes  - special tests:  (+) spurlings    Neuro  - biceps, triceps, supinator DTRs 2+ bilaterally, no sensory or motor deficit, grossly normal coordination, normal muscle tone  Skin  - no ecchymosis, erythema, warmth, or induration, no obvious rash  Psych  - interactive, appropriate, normal  mood and affect  ASSESSMENT & PLAN  62 yo male with cervical ddd, disc herniations, radicular pain, worse    I independently reviewed the following imaging studies:  Cervical MRI: shows ddd, disc herniations  Discussed considering MARIANA, he doesn't want to do this, discussed pain clinic referral, and he wants to see Dr Cardona to discuss surgical options  He continues to have daily pain in his neck with flareups into shoulders  RX given for flexeril and lidocaine patches    Appropriate PPE was utilized for prevention of spread of Covid-19.  Chace Cottrell MD, CAM          Again, thank you for allowing me to participate in the care of your patient.        Sincerely,        Chace Cottrell MD

## 2022-08-01 DIAGNOSIS — M54.12 CERVICAL RADICULOPATHY: Primary | ICD-10-CM

## 2022-08-01 NOTE — PROGRESS NOTES
Patient's spouse informed that pt to arrive 20min earlier for Xray prior to appt with Dr. Cardona on 8/5/22. They were instructed to check-in at desk B for Xray.       Digna Hernandez RNCC  Neurology/Neurosurgery/PM&R

## 2022-08-05 ENCOUNTER — PRE VISIT (OUTPATIENT)
Dept: NEUROSURGERY | Facility: CLINIC | Age: 62
End: 2022-08-05

## 2022-08-19 ENCOUNTER — ANCILLARY PROCEDURE (OUTPATIENT)
Dept: GENERAL RADIOLOGY | Facility: CLINIC | Age: 62
End: 2022-08-19
Attending: ORTHOPAEDIC SURGERY
Payer: COMMERCIAL

## 2022-08-19 DIAGNOSIS — M54.12 CERVICAL RADICULOPATHY: ICD-10-CM

## 2022-08-19 PROCEDURE — 72050 X-RAY EXAM NECK SPINE 4/5VWS: CPT | Performed by: RADIOLOGY

## 2022-08-25 ENCOUNTER — OFFICE VISIT (OUTPATIENT)
Dept: ORTHOPEDICS | Facility: CLINIC | Age: 62
End: 2022-08-25
Payer: COMMERCIAL

## 2022-08-25 DIAGNOSIS — Z47.89 ORTHOPEDIC AFTERCARE: Primary | ICD-10-CM

## 2022-08-25 PROCEDURE — 99024 POSTOP FOLLOW-UP VISIT: CPT | Performed by: ORTHOPAEDIC SURGERY

## 2022-08-25 NOTE — LETTER
8/25/2022         RE: Osbaldo Valerio  [address] [facility] [address]        Dear Colleague,    Thank you for referring your patient, Osbaldo Valerio, to the [facility]. Please see a copy of my visit note below.    CHIEF CONCERN:  6 weeks status post left arthroscopic rotator cuff repair, subacromial bursectomy  DOS: 6/2/2022    HISTORY OF PRESENT ILLNESS:  Osbaldo Valerio is a gentleman three months status post the above-stated procedure.  He reports that his shoulders are achy but he is seeing progress on the left.  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  He has been back to work and what his wife describes as full capacity.  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  He has had symptoms and discomfort with neck pain with ongoing work-up with Dr. Cottrell and Dr. Cardona for his neck.      PHYSICAL EXAM:    Gen: Adult  in no acute distress. Articulates and communicates with normal affect.  Pulm: Respirations even and unlabored on room air  CV: Regular rate via peripheral pulse, extremities wwp  MSK: Focused upper extremity exam: His surgical sites are all well-healed.  There is no erythema.  He is neurovascularly intact to the shoulder and left upper extremity.  His active range of motion is forward elevation to 150, external rotation at the side to 45 and internal rotation to sacrum.    IMAGING:  None new    ASSESSMENT:  1. Three months status post left arthroscopic rotator cuff repair, subacromial bursectomy    PLAN:    Activity:     Range of motion as tolerated    Strengthening as tolerated    Activities as tolerated with expectation of some limitations due to weakness. Plan to progress as symptoms allow.       Work: Activities reviewed and note provided if needed    Follow up: 3 months with no new radiographs needed        Again, thank you for allowing me to participate in the care of your patient.        Sincerely,        Chayito Meade  MD [facility]

## 2022-08-25 NOTE — NURSING NOTE
Reason For Visit:   Chief Complaint   Patient presents with     Surgical Followup     12 wks s/p left arthroscopic rotator cuff repair, subacromial bursectomy DOS: 6/2/22 - both shoulders are very achy       PCP: Mick Varela  Ref: Dr. Cottrell     ?  No  Occupation .  Currently working? Yes.  Work status?  Full time.  Date of surgery: s/p left extensive glenohumeral; left shoulder open long head biceps tenodesis DOS: 3/8/2016; s/p left arthroscopic rotator cuff repair, subacromial bursectomy DOS: 6/2/22     Right hand dominant     SANE score  Affected shoulder: left  Right shoulder SANE: 80-85 - achy and sore  Left shoulder SANE: 80-85    There were no vitals taken for this visit.    Brenda Garza, ATC

## 2022-08-25 NOTE — PROGRESS NOTES
CHIEF CONCERN:  6 weeks status post left arthroscopic rotator cuff repair, subacromial bursectomy  DOS: 6/2/2022    HISTORY OF PRESENT ILLNESS:  Osbaldo Valerio is a gentleman three months status post the above-stated procedure.  He reports that his shoulders are achy but he is seeing progress on the left.  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  He has been back to work and what his wife describes as full capacity.  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  He has had symptoms and discomfort with neck pain with ongoing work-up with Dr. Cottrell and Dr. Cardona for his neck.      PHYSICAL EXAM:    Gen: Adult  in no acute distress. Articulates and communicates with normal affect.  Pulm: Respirations even and unlabored on room air  CV: Regular rate via peripheral pulse, extremities wwp  MSK: Focused upper extremity exam: His surgical sites are all well-healed.  There is no erythema.  He is neurovascularly intact to the shoulder and left upper extremity.  His active range of motion is forward elevation to 150, external rotation at the side to 45 and internal rotation to sacrum.    IMAGING:  None new    ASSESSMENT:  1. Three months status post left arthroscopic rotator cuff repair, subacromial bursectomy    PLAN:    Activity:     Range of motion as tolerated    Strengthening as tolerated    Activities as tolerated with expectation of some limitations due to weakness. Plan to progress as symptoms allow.       Work: Activities reviewed and note provided if needed    Follow up: 3 months with no new radiographs needed

## 2022-09-10 ENCOUNTER — HEALTH MAINTENANCE LETTER (OUTPATIENT)
Age: 62
End: 2022-09-10

## 2023-04-30 ENCOUNTER — HEALTH MAINTENANCE LETTER (OUTPATIENT)
Age: 63
End: 2023-04-30

## 2024-01-31 ENCOUNTER — ANCILLARY PROCEDURE (OUTPATIENT)
Dept: GENERAL RADIOLOGY | Facility: CLINIC | Age: 64
End: 2024-01-31
Attending: PREVENTIVE MEDICINE
Payer: COMMERCIAL

## 2024-01-31 ENCOUNTER — OFFICE VISIT (OUTPATIENT)
Dept: ORTHOPEDICS | Facility: CLINIC | Age: 64
End: 2024-01-31
Payer: COMMERCIAL

## 2024-01-31 DIAGNOSIS — G89.29 CHRONIC PAIN OF BOTH KNEES: ICD-10-CM

## 2024-01-31 DIAGNOSIS — M25.562 CHRONIC PAIN OF BOTH KNEES: ICD-10-CM

## 2024-01-31 DIAGNOSIS — M25.561 CHRONIC PAIN OF BOTH KNEES: ICD-10-CM

## 2024-01-31 DIAGNOSIS — M17.12 ARTHRITIS OF LEFT KNEE: Primary | ICD-10-CM

## 2024-01-31 PROCEDURE — 99214 OFFICE O/P EST MOD 30 MIN: CPT | Mod: 25 | Performed by: PREVENTIVE MEDICINE

## 2024-01-31 PROCEDURE — 20610 DRAIN/INJ JOINT/BURSA W/O US: CPT | Mod: LT | Performed by: PREVENTIVE MEDICINE

## 2024-01-31 PROCEDURE — 73562 X-RAY EXAM OF KNEE 3: CPT | Mod: RT | Performed by: RADIOLOGY

## 2024-01-31 RX ORDER — TRIAMCINOLONE ACETONIDE 40 MG/ML
40 INJECTION, SUSPENSION INTRA-ARTICULAR; INTRAMUSCULAR
Status: SHIPPED | OUTPATIENT
Start: 2024-01-31

## 2024-01-31 RX ADMIN — TRIAMCINOLONE ACETONIDE 40 MG: 40 INJECTION, SUSPENSION INTRA-ARTICULAR; INTRAMUSCULAR at 15:05

## 2024-01-31 NOTE — LETTER
1/31/2024         RE: Osbaldo Valerio  [address] [facility] [address]        Dear Colleague,    Thank you for referring your patient, Osbaldo Valerio, to the [facility] [place]. Please see a copy of my visit note below.    HISTORY OF PRESENT ILLNESS  Mr. Valerio is a pleasant 63 year old year old male who presents to clinic today with the following:  What problem are you here for? Left knee pain   Twisted it last year and has had more knee pain over the past year develop with more activity on his feet and bending and twisting knee      How long have you had this problem? About a year     Have you had any recent imaging of this problem? Xrays/MRI/CT scans? no    Have you had treatments for this problem in the past?  -Medications? no  -Physical therapy? no  -Injections? no  -Surgery? no    How severe is this problem today? 0-10 scale? 5    How severe has this problem been at WORST in the past? 0-10 scale? 9    What do you think caused this problem? Na     Does this problem or its symptoms cause difficulty for you falling asleep or staying asleep? Yes     Anything else you want us to know about this problem? no          MEDICAL HISTORY  Patient Active Problem List   Diagnosis     Abnormal glucose     Morbid obesity (H)     Fatigue     Incomplete tear of left rotator cuff, unspecified whether traumatic       Current Outpatient Medications   Medication Sig Dispense Refill     acetaminophen (TYLENOL) 325 MG tablet Take 2 tablets (650 mg) by mouth every 4 hours as needed for mild pain 50 tablet 0     albuterol (PROAIR HFA/PROVENTIL HFA/VENTOLIN HFA) 108 (90 Base) MCG/ACT inhaler INHALE TWO PUFFS BY MOUTH EVERY 4 HOURS AS NEEDED       amLODIPine (NORVASC) 10 MG tablet Take 10 mg by mouth every evening        aspirin 81 MG tablet Take by mouth daily       cyclobenzaprine (FLEXERIL) 10 MG tablet Take 1 tablet (10 mg) by mouth nightly as needed for muscle spasms 30  tablet 1     diazepam (VALIUM) 5 MG tablet Take 1 tablet (5 mg) by mouth once as needed for anxiety Take 30 minutes prior to MRI. May repeat once as needed. (Patient not taking: Reported on 8/19/2022) 2 tablet 0     furosemide (LASIX) 20 MG tablet Take 40 mg by mouth daily       ibuprofen (ADVIL/MOTRIN) 600 MG tablet Take 1 tablet (600 mg) by mouth every 6 hours as needed for moderate pain 40 tablet 0     lidocaine (LIDODERM) 5 % patch Place 1 patch onto the skin every 24 hours To prevent lidocaine toxicity, patient should be patch free for 12 hrs daily. 15 patch 3     lisinopril-hydrochlorothiazide (PRINZIDE,ZESTORETIC) 20-25 MG per tablet Take 1 tablet by mouth daily Holding am of surgery       LORazepam (ATIVAN) 1 MG tablet Take 1-2 tablets (1-2 mg) by mouth once as needed for anxiety (take 1-2 tablets within 30 minutes prior to cervical injection for anxiety) 2 tablet 0     LORazepam (ATIVAN) 1 MG tablet Take 1 tablet (1 mg) by mouth once as needed for anxiety (take 30 minutes prior to MRI) 1 tablet 0     metFORMIN (GLUCOPHAGE) 500 MG tablet Take 500 mg by mouth 2 times daily (with meals) Hold day of surgery       methocarbamol (ROBAXIN) 500 MG tablet Take 1-2 tablets (500-1,000 mg) by mouth nightly as needed for muscle spasms (Patient not taking: Reported on 8/19/2022) 60 tablet 0     methocarbamol (ROBAXIN) 500 MG tablet Take 1 tablet (500 mg) by mouth 4 times daily as needed for muscle spasms (Patient not taking: Reported on 8/19/2022) 90 tablet 1     Multiple Vitamins-Minerals (MULTIVITAMIN OR) Take 1 tablet by mouth daily       Omega-3 Fatty Acids (OMEGA-3 FISH OIL PO) Take 1 g by mouth daily       oxyCODONE (ROXICODONE) 5 MG tablet Take 1-2 tablets (5-10 mg) by mouth every 4 hours as needed for moderate to severe pain 20 tablet 0     potassium chloride SA (K-DUR,KLOR-CON M) 10 MEQ tablet Take 10 mEq by mouth daily       predniSONE (DELTASONE) 20 MG tablet Take 2 tablets (40 mg) by mouth daily (Patient not  taking: No sig reported) 14 tablet 0     predniSONE (DELTASONE) 20 MG tablet Take 2 tablets (40 mg) by mouth daily (Patient not taking: No sig reported) 12 tablet 0     senna-docusate (SENOKOT-S/PERICOLACE) 8.6-50 MG tablet Take 1-2 tablets by mouth 2 times daily 30 tablet 0     simvastatin (ZOCOR) 40 MG tablet Take 20 mg by mouth At Bedtime       TESTOSTERONE 2MG Inject as directed every 14 days       tiZANidine (ZANAFLEX) 4 MG tablet Take 1 tablet (4 mg) by mouth 3 times daily 90 tablet 1     UNABLE TO FIND MEDICATION NAME: Super Enzymes Vitamin       venlafaxine (EFFEXOR) 75 MG tablet Take 75 mg by mouth 3 times daily       VITAMIN D, CHOLECALCIFEROL, PO Take 1,000 Units by mouth daily (Patient not taking: No sig reported)         No Known Allergies    Family History   Problem Relation Age of Onset     Diabetes Mother         Type II     Heart Disease Father         Quad bypass     Social History     Socioeconomic History     Marital status:    Tobacco Use     Smoking status: Former     Packs/day: 1     Types: Cigarettes     Quit date: 2011     Years since quittin.6     Smokeless tobacco: Never   Substance and Sexual Activity     Alcohol use: Yes     Drug use: No     Sexual activity: Yes     Partners: Female       Additional medical/Social/Surgical histories reviewed in [facility] and updated as appropriate.     REVIEW OF SYSTEMS (2024)  10 point ROS of systems including Constitutional, Eyes, Respiratory, Cardiovascular, Gastroenterology, Genitourinary, Integumentary, Musculoskeletal, Psychiatric, Allergic/Immunologic were all negative except for pertinent positives noted in my HPI.     PHYSICAL EXAM  VSS    General  - normal appearance, in no obvious distress  HEENT  - conjunctivae not injected, moist mucous membranes, normocephalic/atraumatic head, ears normal appearance, no lesions, mouth normal appearance, no scars, normal dentition and teeth present  CV  - normal popliteal pulse  Pulm  -  normal respiratory pattern, non-labored  Musculoskeletal - left knee  - stance: mildly antalgic gait, genu varum  - inspection: generalized swelling, trace effusion  - palpation: medial joint line tenderness, patella and patellar tendon non-tender, normal popliteal pulse  - ROM: 100 degrees flexion, 0 degrees extension, painful active ROM  - strength: 5/5 in flexion, 5/5 in extension  - neuro: no sensory or motor deficit  - special tests:  (-) Lachman  (-) anterior drawer  (-) posterior drawer  (-) pivot shift  (-) Jenny  (-) Thessaly  (-) varus at 0 and 30 degrees flexion  (-) valgus at 0 and 30 degrees flexion  (-) Red s compression test  (-) patellar apprehension  Neuro  - no sensory or motor deficit, grossly normal coordination, normal muscle tone  Skin  - no ecchymosis, erythema, warmth, or induration, no obvious rash  Psych  - interactive, appropriate, normal mood and affect      ASSESSMENT & PLAN  64 yo male with left knee pain and arthritis    I independently reviewed the following imaging studies:  Left knee xray: shows arthritis  After a 20 minute discussion and examination, we decided to perform a same day injection for diagnostic and therapeutic purposes for  Left knee  Discussed and ordered HA injection for left knee  Given HEP        Appropriate PPE was utilized for prevention of spread of Covid-19.  Chace Cottrell MD, CAQSM  PROCEDURE:       left     Knee joint injection   The patient was apprised of the risks and the benefits of the procedure and consented and a written consent was signed.   The patient s  KNEE was sterilely prepped with chloraprep.   40 mg of triamcinolone suspension was drawn up into a 5 mL syringe with 4 mL of 1% lidocaine  The patient was injected into the knee with a 1.5-inch 22-gauge needle at the_superiorlateral aspect of their knee joint  There were no complications. The patient tolerated the procedure well. There was minimal bleeding.   The patient was instructed to ice  the knee upon leaving clinic and refrain from overuse over the next 3 days.   The patient was instructed to go to the emergency room with any usual pain, swelling, or redness occurred in the injected area.   The patient was given a followup appointment to evaluate response to the injection to their increased range of motion and reduction of pain.  Follow up PRN  Dr Chace Cottrell   Large Joint Injection/Arthocentesis: L knee joint    Date/Time: 2024 3:05 PM    Performed by: Chace Cottrell MD  Authorized by: Chace Cottrell MD    Indications:  Pain and osteoarthritis  Needle Size:  22 G  Guidance: landmark guided    Approach:  Superolateral  Location:  Knee      Medications:  40 mg triamcinolone 40 MG/ML  Outcome:  Tolerated well, no immediate complications  Procedure discussed: discussed risks, benefits, and alternatives    Consent Given by:  Patient  Timeout: timeout called immediately prior to procedure    Prep: patient was prepped and draped in usual sterile fashion          [facility]   ORTHOPEDICS & SPORTS MEDICINE  [address] [place] [address]  Dept: [phone]  ______________________________________________________________________________    Patient: Osbaldo Valerio   : 1960   MRN: [id number]   2024    INVASIVE PROCEDURE SAFETY CHECKLIST    Date: 24  Procedure:left knee injection  Patient Name: Osbaldo Valerio  MRN: [id number]  YOB: 1960    Action: Complete sections as appropriate. Any discrepancy results in a HARD COPY until resolved.     PRE PROCEDURE:  Patient ID verified with 2 identifiers (name and  or MRN): Yes  Procedure and site verified with patient/designee (when able): Yes  Accurate consent documentation in medical record: Yes  H&P (or appropriate assessment) documented in medical record: Yes  H&P must be up to 20 days prior to procedure and updates within 24 hours of procedure as applicable: Yes  Relevant  diagnostic and radiology test results appropriately labeled and displayed as applicable: Yes  Procedure site(s) marked with provider initials: Yes    TIMEOUT:  Time-Out performed immediately prior to starting procedure, including verbal and active participation of all team members addressing the following:Yes  * Correct patient identify  * Confirmed that the correct side and site are marked  * An accurate procedure consent form  * Agreement on the procedure to be done  * Correct patient position  * Relevant images and results are properly labeled and appropriately displayed  * The need to administer antibiotics or fluids for irrigation purposes during the procedure as applicable   * Safety precautions based on patient history or medication use    DURING PROCEDURE: Verification of correct person, site, and procedures any time the responsibility for care of the patient is transferred to another member of the care team.       Prior to injection, verified patient identity using patient's name and date of birth.  Due to injection administration, patient instructed to remain in clinic for 15 minutes  afterwards, and to report any adverse reaction to me immediately.    Joint injection was performed    Drug Amount Wasted:  None.  Vial/Syringe: Syringe  Expiration Date:  8/1/25      NEWTON Kim  January 31, 2024      Again, thank you for allowing me to participate in the care of your patient.        Sincerely,        Chace Cottrell MD

## 2024-01-31 NOTE — PROGRESS NOTES
HISTORY OF PRESENT ILLNESS  Mr. Valerio is a pleasant 63 year old year old male who presents to clinic today with the following:  What problem are you here for? Left knee pain   Twisted it last year and has had more knee pain over the past year develop with more activity on his feet and bending and twisting knee      How long have you had this problem? About a year     Have you had any recent imaging of this problem? Xrays/MRI/CT scans? no    Have you had treatments for this problem in the past?  -Medications? no  -Physical therapy? no  -Injections? no  -Surgery? no    How severe is this problem today? 0-10 scale? 5    How severe has this problem been at WORST in the past? 0-10 scale? 9    What do you think caused this problem? Na     Does this problem or its symptoms cause difficulty for you falling asleep or staying asleep? Yes     Anything else you want us to know about this problem? no          MEDICAL HISTORY  Patient Active Problem List   Diagnosis    Abnormal glucose    Morbid obesity (H)    Fatigue    Incomplete tear of left rotator cuff, unspecified whether traumatic       Current Outpatient Medications   Medication Sig Dispense Refill    acetaminophen (TYLENOL) 325 MG tablet Take 2 tablets (650 mg) by mouth every 4 hours as needed for mild pain 50 tablet 0    albuterol (PROAIR HFA/PROVENTIL HFA/VENTOLIN HFA) 108 (90 Base) MCG/ACT inhaler INHALE TWO PUFFS BY MOUTH EVERY 4 HOURS AS NEEDED      amLODIPine (NORVASC) 10 MG tablet Take 10 mg by mouth every evening       aspirin 81 MG tablet Take by mouth daily      cyclobenzaprine (FLEXERIL) 10 MG tablet Take 1 tablet (10 mg) by mouth nightly as needed for muscle spasms 30 tablet 1    diazepam (VALIUM) 5 MG tablet Take 1 tablet (5 mg) by mouth once as needed for anxiety Take 30 minutes prior to MRI. May repeat once as needed. (Patient not taking: Reported on 8/19/2022) 2 tablet 0    furosemide (LASIX) 20 MG tablet Take 40 mg by mouth daily      ibuprofen  (ADVIL/MOTRIN) 600 MG tablet Take 1 tablet (600 mg) by mouth every 6 hours as needed for moderate pain 40 tablet 0    lidocaine (LIDODERM) 5 % patch Place 1 patch onto the skin every 24 hours To prevent lidocaine toxicity, patient should be patch free for 12 hrs daily. 15 patch 3    lisinopril-hydrochlorothiazide (PRINZIDE,ZESTORETIC) 20-25 MG per tablet Take 1 tablet by mouth daily Holding am of surgery      LORazepam (ATIVAN) 1 MG tablet Take 1-2 tablets (1-2 mg) by mouth once as needed for anxiety (take 1-2 tablets within 30 minutes prior to cervical injection for anxiety) 2 tablet 0    LORazepam (ATIVAN) 1 MG tablet Take 1 tablet (1 mg) by mouth once as needed for anxiety (take 30 minutes prior to MRI) 1 tablet 0    metFORMIN (GLUCOPHAGE) 500 MG tablet Take 500 mg by mouth 2 times daily (with meals) Hold day of surgery      methocarbamol (ROBAXIN) 500 MG tablet Take 1-2 tablets (500-1,000 mg) by mouth nightly as needed for muscle spasms (Patient not taking: Reported on 8/19/2022) 60 tablet 0    methocarbamol (ROBAXIN) 500 MG tablet Take 1 tablet (500 mg) by mouth 4 times daily as needed for muscle spasms (Patient not taking: Reported on 8/19/2022) 90 tablet 1    Multiple Vitamins-Minerals (MULTIVITAMIN OR) Take 1 tablet by mouth daily      Omega-3 Fatty Acids (OMEGA-3 FISH OIL PO) Take 1 g by mouth daily      oxyCODONE (ROXICODONE) 5 MG tablet Take 1-2 tablets (5-10 mg) by mouth every 4 hours as needed for moderate to severe pain 20 tablet 0    potassium chloride SA (K-DUR,KLOR-CON M) 10 MEQ tablet Take 10 mEq by mouth daily      predniSONE (DELTASONE) 20 MG tablet Take 2 tablets (40 mg) by mouth daily (Patient not taking: No sig reported) 14 tablet 0    predniSONE (DELTASONE) 20 MG tablet Take 2 tablets (40 mg) by mouth daily (Patient not taking: No sig reported) 12 tablet 0    senna-docusate (SENOKOT-S/PERICOLACE) 8.6-50 MG tablet Take 1-2 tablets by mouth 2 times daily 30 tablet 0    simvastatin (ZOCOR) 40 MG  tablet Take 20 mg by mouth At Bedtime      TESTOSTERONE 2MG Inject as directed every 14 days      tiZANidine (ZANAFLEX) 4 MG tablet Take 1 tablet (4 mg) by mouth 3 times daily 90 tablet 1    UNABLE TO FIND MEDICATION NAME: Super Enzymes Vitamin      venlafaxine (EFFEXOR) 75 MG tablet Take 75 mg by mouth 3 times daily      VITAMIN D, CHOLECALCIFEROL, PO Take 1,000 Units by mouth daily (Patient not taking: No sig reported)         No Known Allergies    Family History   Problem Relation Age of Onset    Diabetes Mother         Type II    Heart Disease Father         Quad bypass     Social History     Socioeconomic History    Marital status:    Tobacco Use    Smoking status: Former     Packs/day: 1     Types: Cigarettes     Quit date: 2011     Years since quittin.6    Smokeless tobacco: Never   Substance and Sexual Activity    Alcohol use: Yes    Drug use: No    Sexual activity: Yes     Partners: Female       Additional medical/Social/Surgical histories reviewed in [facility] and updated as appropriate.     REVIEW OF SYSTEMS (2024)  10 point ROS of systems including Constitutional, Eyes, Respiratory, Cardiovascular, Gastroenterology, Genitourinary, Integumentary, Musculoskeletal, Psychiatric, Allergic/Immunologic were all negative except for pertinent positives noted in my HPI.     PHYSICAL EXAM  VSS    General  - normal appearance, in no obvious distress  HEENT  - conjunctivae not injected, moist mucous membranes, normocephalic/atraumatic head, ears normal appearance, no lesions, mouth normal appearance, no scars, normal dentition and teeth present  CV  - normal popliteal pulse  Pulm  - normal respiratory pattern, non-labored  Musculoskeletal - left knee  - stance: mildly antalgic gait, genu varum  - inspection: generalized swelling, trace effusion  - palpation: medial joint line tenderness, patella and patellar tendon non-tender, normal popliteal pulse  - ROM: 100 degrees flexion, 0 degrees extension,  painful active ROM  - strength: 5/5 in flexion, 5/5 in extension  - neuro: no sensory or motor deficit  - special tests:  (-) Lachman  (-) anterior drawer  (-) posterior drawer  (-) pivot shift  (-) Jenny  (-) Thessaly  (-) varus at 0 and 30 degrees flexion  (-) valgus at 0 and 30 degrees flexion  (-) Red s compression test  (-) patellar apprehension  Neuro  - no sensory or motor deficit, grossly normal coordination, normal muscle tone  Skin  - no ecchymosis, erythema, warmth, or induration, no obvious rash  Psych  - interactive, appropriate, normal mood and affect      ASSESSMENT & PLAN  64 yo male with left knee pain and arthritis    I independently reviewed the following imaging studies:  Left knee xray: shows arthritis  After a 20 minute discussion and examination, we decided to perform a same day injection for diagnostic and therapeutic purposes for  Left knee  Discussed and ordered HA injection for left knee  Given HEP        Appropriate PPE was utilized for prevention of spread of Covid-19.  Chace Cottrell MD, CAQSM  PROCEDURE:       left     Knee joint injection   The patient was apprised of the risks and the benefits of the procedure and consented and a written consent was signed.   The patient s  KNEE was sterilely prepped with chloraprep.   40 mg of triamcinolone suspension was drawn up into a 5 mL syringe with 4 mL of 1% lidocaine  The patient was injected into the knee with a 1.5-inch 22-gauge needle at the_superiorlateral aspect of their knee joint  There were no complications. The patient tolerated the procedure well. There was minimal bleeding.   The patient was instructed to ice the knee upon leaving clinic and refrain from overuse over the next 3 days.   The patient was instructed to go to the emergency room with any usual pain, swelling, or redness occurred in the injected area.   The patient was given a followup appointment to evaluate response to the injection to their increased range of  motion and reduction of pain.  Follow up PRN  Dr Chace Cottrell   Large Joint Injection/Arthocentesis: L knee joint    Date/Time: 2024 3:05 PM    Performed by: Chace Cottrell MD  Authorized by: Chace Cottrell MD    Indications:  Pain and osteoarthritis  Needle Size:  22 G  Guidance: landmark guided    Approach:  Superolateral  Location:  Knee      Medications:  40 mg triamcinolone 40 MG/ML  Outcome:  Tolerated well, no immediate complications  Procedure discussed: discussed risks, benefits, and alternatives    Consent Given by:  Patient  Timeout: timeout called immediately prior to procedure    Prep: patient was prepped and draped in usual sterile fashion          [facility]   ORTHOPEDICS & SPORTS MEDICINE  [address] [place] [address]  Dept: [phone]  ______________________________________________________________________________    Patient: Osbaldo Valerio   : 1960   MRN: [id number]   2024    INVASIVE PROCEDURE SAFETY CHECKLIST    Date: 24  Procedure:left knee injection  Patient Name: Osbaldo Valerio  MRN: [id number]  YOB: 1960    Action: Complete sections as appropriate. Any discrepancy results in a HARD COPY until resolved.     PRE PROCEDURE:  Patient ID verified with 2 identifiers (name and  or MRN): Yes  Procedure and site verified with patient/designee (when able): Yes  Accurate consent documentation in medical record: Yes  H&P (or appropriate assessment) documented in medical record: Yes  H&P must be up to 20 days prior to procedure and updates within 24 hours of procedure as applicable: Yes  Relevant diagnostic and radiology test results appropriately labeled and displayed as applicable: Yes  Procedure site(s) marked with provider initials: Yes    TIMEOUT:  Time-Out performed immediately prior to starting procedure, including verbal and active participation of all team members addressing the following:Yes  *  Correct patient identify  * Confirmed that the correct side and site are marked  * An accurate procedure consent form  * Agreement on the procedure to be done  * Correct patient position  * Relevant images and results are properly labeled and appropriately displayed  * The need to administer antibiotics or fluids for irrigation purposes during the procedure as applicable   * Safety precautions based on patient history or medication use    DURING PROCEDURE: Verification of correct person, site, and procedures any time the responsibility for care of the patient is transferred to another member of the care team.       Prior to injection, verified patient identity using patient's name and date of birth.  Due to injection administration, patient instructed to remain in clinic for 15 minutes  afterwards, and to report any adverse reaction to me immediately.    Joint injection was performed    Drug Amount Wasted:  None.  Vial/Syringe: Syringe  Expiration Date:  8/1/25      NEWTON Kim  January 31, 2024

## 2024-02-01 ENCOUNTER — TELEPHONE (OUTPATIENT)
Dept: ORTHOPEDICS | Facility: CLINIC | Age: 64
End: 2024-02-01
Payer: COMMERCIAL

## 2024-02-01 DIAGNOSIS — M17.12 ARTHRITIS OF LEFT KNEE: Primary | ICD-10-CM

## 2024-03-14 ENCOUNTER — OFFICE VISIT (OUTPATIENT)
Dept: ORTHOPEDICS | Facility: CLINIC | Age: 64
End: 2024-03-14
Payer: COMMERCIAL

## 2024-03-14 DIAGNOSIS — M17.12 ARTHRITIS OF LEFT KNEE: Primary | ICD-10-CM

## 2024-03-14 PROCEDURE — 99207 PR DROP WITH A PROCEDURE: CPT | Performed by: PREVENTIVE MEDICINE

## 2024-03-14 PROCEDURE — 20610 DRAIN/INJ JOINT/BURSA W/O US: CPT | Mod: LT | Performed by: PREVENTIVE MEDICINE

## 2024-03-14 NOTE — NURSING NOTE
[facility]   ORTHOPEDICS & SPORTS MEDICINE  [address] [place] [address]  Dept: [phone]  ______________________________________________________________________________    Patient: Osbaldo Valerio   : 1960   MRN: [id number]   2024    INVASIVE PROCEDURE SAFETY CHECKLIST    Date: 3/14/24   Procedure: Left Knee Synvisc One Injection  Patient Name: Osbaldo Valerio  MRN: [id number]  YOB: 1960    Action: Complete sections as appropriate. Any discrepancy results in a HARD COPY until resolved.     PRE PROCEDURE:  Patient ID verified with 2 identifiers (name and  or MRN): Yes  Procedure and site verified with patient/designee (when able): Yes  Accurate consent documentation in medical record: Yes  H&P (or appropriate assessment) documented in medical record: Yes  H&P must be up to 20 days prior to procedure and updates within 24 hours of procedure as applicable: NA  Relevant diagnostic and radiology test results appropriately labeled and displayed as applicable: NA  Procedure site(s) marked with provider initials: NA    TIMEOUT:  Time-Out performed immediately prior to starting procedure, including verbal and active participation of all team members addressing the following:Yes  * Correct patient identify  * Confirmed that the correct side and site are marked  * An accurate procedure consent form  * Agreement on the procedure to be done  * Correct patient position  * Relevant images and results are properly labeled and appropriately displayed  * The need to administer antibiotics or fluids for irrigation purposes during the procedure as applicable   * Safety precautions based on patient history or medication use    DURING PROCEDURE: Verification of correct person, site, and procedures any time the responsibility for care of the patient is transferred to another member of the care team.       Prior to injection, verified patient identity using patient's  name and date of birth.  Due to injection administration, patient instructed to remain in clinic for 15 minutes  afterwards, and to report any adverse reaction to me immediately.    Joint injection was performed.      Drug Amount Wasted:  None.  Vial/Syringe: Syringe  Expiration Date:  07/31/2026      Silverio Valdez, GEMA  March 14, 2024

## 2024-03-14 NOTE — LETTER
3/14/2024         RE: Osbaldo Valerio  [address] [facility] [address]        Dear Colleague,    Thank you for referring your patient, Osbaldo Valerio, to the [facility] SPORTS MEDICINE [facility]IN[facility] [place]. Please see a copy of my visit note below.    HISTORY OF PRESENT ILLNESS  Mr. Valerio is a pleasant 63 year old year old male who presents to clinic today with the following:  What problem are you here for? Left Knee Synvisc-One Injection  Was approved for it  And wants to discuss doing it today  Had some success and improvement with injection in Jan.   For his knee pain but still having some  pain  How long have you had this problem? A little over 1 year    Have you had any recent imaging of this problem? Xrays/MRI/CT scans? Yes    Have you had treatments for this problem in the past?  -Medications? NO  -Physical therapy? No  -Injections? Yes  -Surgery? No    How severe is this problem today? 0-10 scale? 5    How severe has this problem been at WORST in the past? 0-10 scale? 9    What do you think caused this problem? Life    Does this problem or its symptoms cause difficulty for you falling asleep or staying asleep? No    Anything else you want us to know about this problem? None.           MEDICAL HISTORY  Patient Active Problem List   Diagnosis     Abnormal glucose     Morbid obesity (H)     Fatigue     Incomplete tear of left rotator cuff, unspecified whether traumatic       Current Outpatient Medications   Medication Sig Dispense Refill     acetaminophen (TYLENOL) 325 MG tablet Take 2 tablets (650 mg) by mouth every 4 hours as needed for mild pain 50 tablet 0     albuterol (PROAIR HFA/PROVENTIL HFA/VENTOLIN HFA) 108 (90 Base) MCG/ACT inhaler INHALE TWO PUFFS BY MOUTH EVERY 4 HOURS AS NEEDED       amLODIPine (NORVASC) 10 MG tablet Take 10 mg by mouth every evening        aspirin 81 MG tablet Take by mouth daily       cyclobenzaprine (FLEXERIL) 10 MG tablet Take 1 tablet (10  mg) by mouth nightly as needed for muscle spasms 30 tablet 1     diazepam (VALIUM) 5 MG tablet Take 1 tablet (5 mg) by mouth once as needed for anxiety Take 30 minutes prior to MRI. May repeat once as needed. (Patient not taking: Reported on 8/19/2022) 2 tablet 0     furosemide (LASIX) 20 MG tablet Take 40 mg by mouth daily       ibuprofen (ADVIL/MOTRIN) 600 MG tablet Take 1 tablet (600 mg) by mouth every 6 hours as needed for moderate pain 40 tablet 0     lidocaine (LIDODERM) 5 % patch Place 1 patch onto the skin every 24 hours To prevent lidocaine toxicity, patient should be patch free for 12 hrs daily. 15 patch 3     lisinopril-hydrochlorothiazide (PRINZIDE,ZESTORETIC) 20-25 MG per tablet Take 1 tablet by mouth daily Holding am of surgery       LORazepam (ATIVAN) 1 MG tablet Take 1-2 tablets (1-2 mg) by mouth once as needed for anxiety (take 1-2 tablets within 30 minutes prior to cervical injection for anxiety) 2 tablet 0     LORazepam (ATIVAN) 1 MG tablet Take 1 tablet (1 mg) by mouth once as needed for anxiety (take 30 minutes prior to MRI) 1 tablet 0     metFORMIN (GLUCOPHAGE) 500 MG tablet Take 500 mg by mouth 2 times daily (with meals) Hold day of surgery       methocarbamol (ROBAXIN) 500 MG tablet Take 1-2 tablets (500-1,000 mg) by mouth nightly as needed for muscle spasms (Patient not taking: Reported on 8/19/2022) 60 tablet 0     methocarbamol (ROBAXIN) 500 MG tablet Take 1 tablet (500 mg) by mouth 4 times daily as needed for muscle spasms (Patient not taking: Reported on 8/19/2022) 90 tablet 1     Multiple Vitamins-Minerals (MULTIVITAMIN OR) Take 1 tablet by mouth daily       Omega-3 Fatty Acids (OMEGA-3 FISH OIL PO) Take 1 g by mouth daily       oxyCODONE (ROXICODONE) 5 MG tablet Take 1-2 tablets (5-10 mg) by mouth every 4 hours as needed for moderate to severe pain 20 tablet 0     potassium chloride SA (K-DUR,KLOR-CON M) 10 MEQ tablet Take 10 mEq by mouth daily       predniSONE (DELTASONE) 20 MG tablet  Take 2 tablets (40 mg) by mouth daily (Patient not taking: No sig reported) 14 tablet 0     predniSONE (DELTASONE) 20 MG tablet Take 2 tablets (40 mg) by mouth daily (Patient not taking: No sig reported) 12 tablet 0     senna-docusate (SENOKOT-S/PERICOLACE) 8.6-50 MG tablet Take 1-2 tablets by mouth 2 times daily 30 tablet 0     simvastatin (ZOCOR) 40 MG tablet Take 20 mg by mouth At Bedtime       TESTOSTERONE 2MG Inject as directed every 14 days       tiZANidine (ZANAFLEX) 4 MG tablet Take 1 tablet (4 mg) by mouth 3 times daily 90 tablet 1     UNABLE TO FIND MEDICATION NAME: Super Enzymes Vitamin       venlafaxine (EFFEXOR) 75 MG tablet Take 75 mg by mouth 3 times daily       VITAMIN D, CHOLECALCIFEROL, PO Take 1,000 Units by mouth daily (Patient not taking: No sig reported)         No Known Allergies    Family History   Problem Relation Age of Onset     Diabetes Mother         Type II     Heart Disease Father         Quad bypass     Social History     Socioeconomic History     Marital status:    Tobacco Use     Smoking status: Former     Packs/day: 1     Types: Cigarettes     Quit date: 2011     Years since quittin.7     Smokeless tobacco: Never   Substance and Sexual Activity     Alcohol use: Yes     Drug use: No     Sexual activity: Yes     Partners: Female       Additional medical/Social/Surgical histories [facility] in [facility] and updated as appropriate.     REVIEW OF SYSTEMS (3/14/2024)  10 point ROS of systems including Constitutional, Eyes, Respiratory, Cardiovascular, Gastroenterology, Genitourinary, Integumentary, Musculoskeletal, Psychiatric, Allergic/Immunologic were all negative except for pertinent positives noted in my HPI.     PHYSICAL EXAM  VS    General  - normal appearance, in no obvious distress  HEENT  - conjunctivae not injected, moist mucous membranes, normocephalic/atraumatic head, ears normal appearance, no lesions, mouth normal appearance, no scars, normal dentition and teeth  present  CV  - normal popliteal pulse  Pulm  - normal respiratory pattern, non-labored  Musculoskeletal - left knee  - stance: mildly antalgic gait, genu varum  - inspection: some generalized swelling, trace effusion  - palpation: medial joint line tenderness, patella and patellar tendon non-tender, normal popliteal pulse  - ROM: 100 degrees flexion, 0 degrees extension, painful active ROM  - strength: 5/5 in flexion, 5/5 in extension  - neuro: no sensory or motor deficit  - special tests:  (-) Lachman  (-) anterior drawer  (-) posterior drawer  (-) pivot shift  (-) Jenny  (-) Thessaly  (-) varus at 0 and 30 degrees flexion  (-) valgus at 0 and 30 degrees flexion  (+) Red s compression test  (-) patellar apprehension  Neuro  - no sensory or motor deficit, grossly normal coordination, normal muscle tone  Skin  - no ecchymosis, erythema, warmth, or induration, no obvious rash  Psych  - interactive, appropriate, normal mood and affect     ASSESSMENT & PLAN  62 yo male with left knee arthritis not resolved    I independently reviewed the following imaging studies:  Left knee xray: shows arthritis  After a 20 minute discussion and examination, we decided to perform a same day injection for diagnostic and therapeutic purposes for left knee with synvisc      Appropriate PPE was utilized for prevention of spread of Covid-19.  Chace Cottrell MD, CAQSM    PROCEDURE:           left  Knee joint injection with synvisc one    The patient was apprised of the risks and the benefits of the procedure and consented and a written consent was signed.   The patient s  KNEE was sterilely prepped with chloraprep.     The patient was injected with synvisc one syringe into the left knee with a 1.5-inch 22-gauge needle at the_superiorlateral aspect of their knee joint    There were no complications. The patient tolerated the procedure well. There was minimal bleeding.   The patient was instructed to ice the knee upon leaving clinic and  refrain from overuse over the next 3 days.   The patient was instructed to go to the emergency room with any usual pain, swelling, or redness occurred in the injected area.   The patient was given a followup appointment to evaluate response to the injection to their increased range of motion and reduction of pain.  Follow up for euflexxa  Dr Chace Cottrell     Large Joint Injection/Arthocentesis: L knee joint    Date/Time: 3/14/2024 10:16 AM    Performed by: Chace Cottrell MD  Authorized by: Chace Cottrell MD    Indications:  Osteoarthritis, pain and joint swelling  Needle Size:  22 G  Guidance: landmark guided    Approach:  Superolateral  Location:  Knee      Medications:  48 mg hylan 48 MG/6ML  Outcome:  Tolerated well, no immediate complications  Procedure discussed: discussed risks, benefits, and alternatives    Consent Given by:  Patient  Timeout: timeout called immediately prior to procedure    Prep: patient was prepped and draped in usual sterile fashion          Again, thank you for allowing me to participate in the care of your patient.        Sincerely,        Chace Cottrell MD

## 2024-03-14 NOTE — PROGRESS NOTES
HISTORY OF PRESENT ILLNESS  Mr. Valerio is a pleasant 63 year old year old male who presents to clinic today with the following:  What problem are you here for? Left Knee Synvisc-One Injection  Was approved for it  And wants to discuss doing it today  Had some success and improvement with injection in Jan.   For his knee pain but still having some  pain  How long have you had this problem? A little over 1 year    Have you had any recent imaging of this problem? Xrays/MRI/CT scans? Yes    Have you had treatments for this problem in the past?  -Medications? NO  -Physical therapy? No  -Injections? Yes  -Surgery? No    How severe is this problem today? 0-10 scale? 5    How severe has this problem been at WORST in the past? 0-10 scale? 9    What do you think caused this problem? Life    Does this problem or its symptoms cause difficulty for you falling asleep or staying asleep? No    Anything else you want us to know about this problem? None.           MEDICAL HISTORY  Patient Active Problem List   Diagnosis    Abnormal glucose    Morbid obesity (H)    Fatigue    Incomplete tear of left rotator cuff, unspecified whether traumatic       Current Outpatient Medications   Medication Sig Dispense Refill    acetaminophen (TYLENOL) 325 MG tablet Take 2 tablets (650 mg) by mouth every 4 hours as needed for mild pain 50 tablet 0    albuterol (PROAIR HFA/PROVENTIL HFA/VENTOLIN HFA) 108 (90 Base) MCG/ACT inhaler INHALE TWO PUFFS BY MOUTH EVERY 4 HOURS AS NEEDED      amLODIPine (NORVASC) 10 MG tablet Take 10 mg by mouth every evening       aspirin 81 MG tablet Take by mouth daily      cyclobenzaprine (FLEXERIL) 10 MG tablet Take 1 tablet (10 mg) by mouth nightly as needed for muscle spasms 30 tablet 1    diazepam (VALIUM) 5 MG tablet Take 1 tablet (5 mg) by mouth once as needed for anxiety Take 30 minutes prior to MRI. May repeat once as needed. (Patient not taking: Reported on 8/19/2022) 2 tablet 0    furosemide (LASIX) 20 MG  tablet Take 40 mg by mouth daily      ibuprofen (ADVIL/MOTRIN) 600 MG tablet Take 1 tablet (600 mg) by mouth every 6 hours as needed for moderate pain 40 tablet 0    lidocaine (LIDODERM) 5 % patch Place 1 patch onto the skin every 24 hours To prevent lidocaine toxicity, patient should be patch free for 12 hrs daily. 15 patch 3    lisinopril-hydrochlorothiazide (PRINZIDE,ZESTORETIC) 20-25 MG per tablet Take 1 tablet by mouth daily Holding am of surgery      LORazepam (ATIVAN) 1 MG tablet Take 1-2 tablets (1-2 mg) by mouth once as needed for anxiety (take 1-2 tablets within 30 minutes prior to cervical injection for anxiety) 2 tablet 0    LORazepam (ATIVAN) 1 MG tablet Take 1 tablet (1 mg) by mouth once as needed for anxiety (take 30 minutes prior to MRI) 1 tablet 0    metFORMIN (GLUCOPHAGE) 500 MG tablet Take 500 mg by mouth 2 times daily (with meals) Hold day of surgery      methocarbamol (ROBAXIN) 500 MG tablet Take 1-2 tablets (500-1,000 mg) by mouth nightly as needed for muscle spasms (Patient not taking: Reported on 8/19/2022) 60 tablet 0    methocarbamol (ROBAXIN) 500 MG tablet Take 1 tablet (500 mg) by mouth 4 times daily as needed for muscle spasms (Patient not taking: Reported on 8/19/2022) 90 tablet 1    Multiple Vitamins-Minerals (MULTIVITAMIN OR) Take 1 tablet by mouth daily      Omega-3 Fatty Acids (OMEGA-3 FISH OIL PO) Take 1 g by mouth daily      oxyCODONE (ROXICODONE) 5 MG tablet Take 1-2 tablets (5-10 mg) by mouth every 4 hours as needed for moderate to severe pain 20 tablet 0    potassium chloride SA (K-DUR,KLOR-CON M) 10 MEQ tablet Take 10 mEq by mouth daily      predniSONE (DELTASONE) 20 MG tablet Take 2 tablets (40 mg) by mouth daily (Patient not taking: No sig reported) 14 tablet 0    predniSONE (DELTASONE) 20 MG tablet Take 2 tablets (40 mg) by mouth daily (Patient not taking: No sig reported) 12 tablet 0    senna-docusate (SENOKOT-S/PERICOLACE) 8.6-50 MG tablet Take 1-2 tablets by mouth 2  times daily 30 tablet 0    simvastatin (ZOCOR) 40 MG tablet Take 20 mg by mouth At Bedtime      TESTOSTERONE 2MG Inject as directed every 14 days      tiZANidine (ZANAFLEX) 4 MG tablet Take 1 tablet (4 mg) by mouth 3 times daily 90 tablet 1    UNABLE TO FIND MEDICATION NAME: Super Enzymes Vitamin      venlafaxine (EFFEXOR) 75 MG tablet Take 75 mg by mouth 3 times daily      VITAMIN D, CHOLECALCIFEROL, PO Take 1,000 Units by mouth daily (Patient not taking: No sig reported)         No Known Allergies    Family History   Problem Relation Age of Onset    Diabetes Mother         Type II    Heart Disease Father         Quad bypass     Social History     Socioeconomic History    Marital status:    Tobacco Use    Smoking status: Former     Packs/day: 1     Types: Cigarettes     Quit date: 2011     Years since quittin.7    Smokeless tobacco: Never   Substance and Sexual Activity    Alcohol use: Yes    Drug use: No    Sexual activity: Yes     Partners: Female       Additional medical/Social/Surgical histories reviewed in [facility] and updated as appropriate.     REVIEW OF SYSTEMS (3/14/2024)  10 point ROS of systems including Constitutional, Eyes, Respiratory, Cardiovascular, Gastroenterology, Genitourinary, Integumentary, Musculoskeletal, Psychiatric, Allergic/Immunologic were all negative except for pertinent positives noted in my HPI.     PHYSICAL EXAM  VS    General  - normal appearance, in no obvious distress  HEENT  - conjunctivae not injected, moist mucous membranes, normocephalic/atraumatic head, ears normal appearance, no lesions, mouth normal appearance, no scars, normal dentition and teeth present  CV  - normal popliteal pulse  Pulm  - normal respiratory pattern, non-labored  Musculoskeletal - left knee  - stance: mildly antalgic gait, genu varum  - inspection: some generalized swelling, trace effusion  - palpation: medial joint line tenderness, patella and patellar tendon non-tender, normal popliteal  pulse  - ROM: 100 degrees flexion, 0 degrees extension, painful active ROM  - strength: 5/5 in flexion, 5/5 in extension  - neuro: no sensory or motor deficit  - special tests:  (-) Lachman  (-) anterior drawer  (-) posterior drawer  (-) pivot shift  (-) Jenny  (-) Thessaly  (-) varus at 0 and 30 degrees flexion  (-) valgus at 0 and 30 degrees flexion  (+) Red s compression test  (-) patellar apprehension  Neuro  - no sensory or motor deficit, grossly normal coordination, normal muscle tone  Skin  - no ecchymosis, erythema, warmth, or induration, no obvious rash  Psych  - interactive, appropriate, normal mood and affect     ASSESSMENT & PLAN  64 yo male with left knee arthritis not resolved    I independently reviewed the following imaging studies:  Left knee xray: shows arthritis  After a 20 minute discussion and examination, we decided to perform a same day injection for diagnostic and therapeutic purposes for left knee with synvisc      Appropriate PPE was utilized for prevention of spread of Covid-19.  Chace Cottrell MD, CAQSM    PROCEDURE:           left  Knee joint injection with synvisc one    The patient was apprised of the risks and the benefits of the procedure and consented and a written consent was signed.   The patient s  KNEE was sterilely prepped with chloraprep.     The patient was injected with synvisc one syringe into the left knee with a 1.5-inch 22-gauge needle at the_superiorlateral aspect of their knee joint    There were no complications. The patient tolerated the procedure well. There was minimal bleeding.   The patient was instructed to ice the knee upon leaving clinic and refrain from overuse over the next 3 days.   The patient was instructed to go to the emergency room with any usual pain, swelling, or redness occurred in the injected area.   The patient was given a followup appointment to evaluate response to the injection to their increased range of motion and reduction of  pain.  Follow up for euflexxa  Dr Chace Cottrell     Large Joint Injection/Arthocentesis: L knee joint    Date/Time: 3/14/2024 10:16 AM    Performed by: Chace Cottrell MD  Authorized by: Chace Cottrell MD    Indications:  Osteoarthritis, pain and joint swelling  Needle Size:  22 G  Guidance: landmark guided    Approach:  Superolateral  Location:  Knee      Medications:  48 mg hylan 48 MG/6ML  Outcome:  Tolerated well, no immediate complications  Procedure discussed: discussed risks, benefits, and alternatives    Consent Given by:  Patient  Timeout: timeout called immediately prior to procedure    Prep: patient was prepped and draped in usual sterile fashion

## 2024-04-03 ENCOUNTER — VIRTUAL VISIT (OUTPATIENT)
Dept: ORTHOPEDICS | Facility: CLINIC | Age: 64
End: 2024-04-03
Payer: COMMERCIAL

## 2024-04-03 DIAGNOSIS — M17.12 ARTHRITIS OF LEFT KNEE: Primary | ICD-10-CM

## 2024-04-03 PROCEDURE — 99213 OFFICE O/P EST LOW 20 MIN: CPT | Mod: 93 | Performed by: PREVENTIVE MEDICINE

## 2024-04-03 NOTE — LETTER
4/3/2024         RE: Osbaldo Valerio  [address]  [facility] [address]        Dear Colleague,    Thank you for referring your patient, Osbaldo Valerio, to the [facility]IC[facility] [place]. Please see a copy of my visit note below.    Patient is a   63  year old who is being evaluated via a billable telephone visit.      What phone number would you like to be contacted at? CELL  How would you like to obtain your AVS? MYCHART        Subjective   Patient is a   63  year old who presents by phone call visit for the following:     HPI   Followup for left knee  Overall doing better  Had gel injection which did help overall  Still has some pain here and there but doing better    Review of Systems   Constitutional, HEENT, cardiovascular, pulmonary, gi and gu systems are negative, except as otherwise noted.      Objective           Vitals:  No vitals were obtained today due to virtual visit.    Physical Exam   healthy, alert, and no distress  PSYCH: Alert and oriented times 3; coherent speech, normal   rate and volume, able to articulate logical thoughts, able   to abstract reason, no tangential thoughts, no hallucinations   or delusions  His affect is normal  RESP: No cough, no audible wheezing, able to talk in full sentences  Remainder of exam unable to be completed due to telephone visits    Assessment/Plan  62 yo male with left knee arthritis, improved    I independently reviewed the following imaging studies and discussed with patient:  Left knee xray: shows arthritis  Discussed can consider CSI or repeat gel injection in Sept.   Followup PRN          Phone call duration: 20 minutes  Phone call start: 230pm  Phone call end: 250pm  Dr Cottrell      Again, thank you for allowing me to participate in the care of your patient.        Sincerely,        Chace Cottrell MD

## 2024-04-03 NOTE — LETTER
4/3/2024         RE: Osbaldo Valerio  [address]  [place] [facility] [address]        Dear Colleague,    Thank you for referring your patient, Osbaldo Valerio, to the [facility] [place]. Please see a copy of my visit note below.    Patient is a   63  year old who is being evaluated via a billable telephone visit.      What phone number would you like to be contacted at? CELL  How would you like to obtain your AVS? MYCHART        Subjective   Patient is a   63  year old who presents by phone call visit for the following:     HPI   Followup for left knee  Overall doing better  Had gel injection which did help overall  Still has some pain here and there but doing better    Review of Systems   Constitutional, HEENT, cardiovascular, pulmonary, gi and gu systems are negative, except as otherwise noted.      Objective           Vitals:  No vitals were obtained today due to virtual visit.    Physical Exam   healthy, alert, and no distress  PSYCH: Alert and oriented times 3; coherent speech, normal   rate and volume, able to articulate logical thoughts, able   to abstract reason, no tangential thoughts, no hallucinations   or delusions  His affect is normal  RESP: No cough, no audible wheezing, able to talk in full sentences  Remainder of exam unable to be completed due to telephone visits    Assessment/Plan  64 yo male with left knee arthritis, improved    I independently reviewed the following imaging studies and discussed with patient:  Left knee xray: shows arthritis  Discussed can consider CSI or repeat gel injection in Sept.   Followup PRN          Phone call duration: 20 minutes  Phone call start: 230pm  Phone call end: 250pm  Dr Cottrell      Again, thank you for allowing me to participate in the care of your patient.        Sincerely,        Chace Cottrell MD

## 2024-04-03 NOTE — PROGRESS NOTES
Patient is a   63  year old who is being evaluated via a billable telephone visit.      What phone number would you like to be contacted at? CELL  How would you like to obtain your AVS? KAMILLA        Subjective   Patient is a   63  year old who presents by phone call visit for the following:     HPI   Followup for left knee  Overall doing better  Had gel injection which did help overall  Still has some pain here and there but doing better    Review of Systems   Constitutional, HEENT, cardiovascular, pulmonary, gi and gu systems are negative, except as otherwise noted.      Objective           Vitals:  No vitals were obtained today due to virtual visit.    Physical Exam   healthy, alert, and no distress  PSYCH: Alert and oriented times 3; coherent speech, normal   rate and volume, able to articulate logical thoughts, able   to abstract reason, no tangential thoughts, no hallucinations   or delusions  His affect is normal  RESP: No cough, no audible wheezing, able to talk in full sentences  Remainder of exam unable to be completed due to telephone visits    Assessment/Plan  64 yo male with left knee arthritis, improved    I independently reviewed the following imaging studies and discussed with patient:  Left knee xray: shows arthritis  Discussed can consider CSI or repeat gel injection in Sept.   Followup PRN          Phone call duration: 20 minutes  Phone call start: 230pm  Phone call end: 250pm  Dr Cottrell

## 2024-07-07 ENCOUNTER — HEALTH MAINTENANCE LETTER (OUTPATIENT)
Age: 64
End: 2024-07-07

## 2024-07-30 NOTE — TELEPHONE ENCOUNTER
ECHO 6/10/2024 = LVEF 40-45% with mild global hypokinesis   Status post NM stress test on 6/11/2024 which showed: a large, mild, fixed defect in the inferior wall, possibly due to diaphragmatic attenuation artifact, there is a small area of partial reversibility in the inferior apical wall suggestive of ischemia    Evaluated ed by cardiology, etiology felt to be possibly secondary to stress-induced cardiomyopathy, with apical thrombus  Cardiac catheterization deferred given the lack of any significant ischemia, and no current cardiac symptoms  Continue with medical management with aspirin, statin, beta-blocker, ARB  Monitor volume status, remains euvolemic off of diuretics   Euvolemic on exam  Outpatient follow-up with cardiology   Per Dr. Cottrell- he would like patient to be seen by Dr. Mayers. Called Dr. Mayers's office to see if we can get Osbaldo scheduled earlier than August. Spoke with nurse and she stated when she speaks with Riana they will contact patient to get him scheduled.

## 2024-09-11 DIAGNOSIS — M17.12 PRIMARY OSTEOARTHRITIS OF LEFT KNEE: Primary | ICD-10-CM

## 2024-10-07 ENCOUNTER — OFFICE VISIT (OUTPATIENT)
Dept: ORTHOPEDICS | Facility: CLINIC | Age: 64
End: 2024-10-07
Payer: COMMERCIAL

## 2024-10-07 DIAGNOSIS — M17.12 ARTHRITIS OF LEFT KNEE: Primary | ICD-10-CM

## 2024-10-07 PROCEDURE — 99213 OFFICE O/P EST LOW 20 MIN: CPT | Mod: 25 | Performed by: PREVENTIVE MEDICINE

## 2024-10-07 PROCEDURE — 20610 DRAIN/INJ JOINT/BURSA W/O US: CPT | Mod: LT | Performed by: PREVENTIVE MEDICINE

## 2024-10-07 RX ORDER — TRIAMCINOLONE ACETONIDE 40 MG/ML
40 INJECTION, SUSPENSION INTRA-ARTICULAR; INTRAMUSCULAR
Status: SHIPPED | OUTPATIENT
Start: 2024-10-07

## 2024-10-07 RX ORDER — LIDOCAINE HYDROCHLORIDE 10 MG/ML
3 INJECTION, SOLUTION INFILTRATION; PERINEURAL
Status: SHIPPED | OUTPATIENT
Start: 2024-10-07

## 2024-10-07 RX ORDER — ROSUVASTATIN CALCIUM 10 MG/1
10 TABLET, COATED ORAL DAILY
COMMUNITY

## 2024-10-07 RX ADMIN — LIDOCAINE HYDROCHLORIDE 3 ML: 10 INJECTION, SOLUTION INFILTRATION; PERINEURAL at 10:58

## 2024-10-07 RX ADMIN — TRIAMCINOLONE ACETONIDE 40 MG: 40 INJECTION, SUSPENSION INTRA-ARTICULAR; INTRAMUSCULAR at 10:58

## 2024-10-07 NOTE — PROGRESS NOTES
HISTORY OF PRESENT ILLNESS  Mr. Valerio is a pleasant 63 year old year old male who presents to clinic today with the following:    What problem are you here for: Follow up for bilateral knee pain/OA. Left knee is worse  And has been treated with BOTH cortisone injections and Synivsc one injections in the past that have provided over 3 months of greater than 50% relief when he has had these in the past  He has pain daily with more walking and squatting and carrying things in his left knee  Has been doing home exercise program for the past year at home without resolution of his pain but does think that it helps.  He would like to get another cortisone injection today and then consider synivsc injection again as he feels like the left knee will benefit from it in the long run  He uses tylenol and ibuprofen regularly to help with the knee pain when it gets more painful and has also used voltaren gel     How long have you had this problem: Chronic     Have you had any recent imaging of this problem? Xrays/MRI/CT scans:  - XR of bilateral     Have you had treatments for this problem in the past?  -Medications: None  -Physical therapy: None   -Injections:  Left Knee Synvisc-One 3/14/2024: he reports this injection provided only 2 months of at least 50% decreased pain in his left knee.   Left knee CSI 1/31/2024: he reports this injection provided 2 months of at least 50% decreased pain.   - Knee Brace: None     How severe is this problem today? 0-10 scale: 4/10    Does this problem or its symptoms cause difficulty for you falling asleep or staying asleep: No       MEDICAL HISTORY  Patient Active Problem List   Diagnosis    Abnormal glucose    Morbid obesity (H)    Fatigue    Incomplete tear of left rotator cuff, unspecified whether traumatic       Current Outpatient Medications   Medication Sig Dispense Refill    acetaminophen (TYLENOL) 325 MG tablet Take 2 tablets (650 mg) by mouth every 4 hours as needed for mild  pain 50 tablet 0    albuterol (PROAIR HFA/PROVENTIL HFA/VENTOLIN HFA) 108 (90 Base) MCG/ACT inhaler INHALE TWO PUFFS BY MOUTH EVERY 4 HOURS AS NEEDED      amLODIPine (NORVASC) 10 MG tablet Take 10 mg by mouth every evening       aspirin 81 MG tablet Take by mouth daily      cyclobenzaprine (FLEXERIL) 10 MG tablet Take 1 tablet (10 mg) by mouth nightly as needed for muscle spasms 30 tablet 1    diazepam (VALIUM) 5 MG tablet Take 1 tablet (5 mg) by mouth once as needed for anxiety Take 30 minutes prior to MRI. May repeat once as needed. (Patient not taking: Reported on 8/19/2022) 2 tablet 0    furosemide (LASIX) 20 MG tablet Take 40 mg by mouth daily      ibuprofen (ADVIL/MOTRIN) 600 MG tablet Take 1 tablet (600 mg) by mouth every 6 hours as needed for moderate pain 40 tablet 0    lidocaine (LIDODERM) 5 % patch Place 1 patch onto the skin every 24 hours To prevent lidocaine toxicity, patient should be patch free for 12 hrs daily. 15 patch 3    lisinopril-hydrochlorothiazide (PRINZIDE,ZESTORETIC) 20-25 MG per tablet Take 1 tablet by mouth daily Holding am of surgery      LORazepam (ATIVAN) 1 MG tablet Take 1-2 tablets (1-2 mg) by mouth once as needed for anxiety (take 1-2 tablets within 30 minutes prior to cervical injection for anxiety) 2 tablet 0    LORazepam (ATIVAN) 1 MG tablet Take 1 tablet (1 mg) by mouth once as needed for anxiety (take 30 minutes prior to MRI) 1 tablet 0    metFORMIN (GLUCOPHAGE) 500 MG tablet Take 500 mg by mouth 2 times daily (with meals) Hold day of surgery      methocarbamol (ROBAXIN) 500 MG tablet Take 1-2 tablets (500-1,000 mg) by mouth nightly as needed for muscle spasms (Patient not taking: Reported on 8/19/2022) 60 tablet 0    methocarbamol (ROBAXIN) 500 MG tablet Take 1 tablet (500 mg) by mouth 4 times daily as needed for muscle spasms (Patient not taking: Reported on 8/19/2022) 90 tablet 1    Multiple Vitamins-Minerals (MULTIVITAMIN OR) Take 1 tablet by mouth daily      Omega-3 Fatty  Acids (OMEGA-3 FISH OIL PO) Take 1 g by mouth daily      oxyCODONE (ROXICODONE) 5 MG tablet Take 1-2 tablets (5-10 mg) by mouth every 4 hours as needed for moderate to severe pain 20 tablet 0    potassium chloride SA (K-DUR,KLOR-CON M) 10 MEQ tablet Take 10 mEq by mouth daily      predniSONE (DELTASONE) 20 MG tablet Take 2 tablets (40 mg) by mouth daily (Patient not taking: No sig reported) 14 tablet 0    predniSONE (DELTASONE) 20 MG tablet Take 2 tablets (40 mg) by mouth daily (Patient not taking: No sig reported) 12 tablet 0    senna-docusate (SENOKOT-S/PERICOLACE) 8.6-50 MG tablet Take 1-2 tablets by mouth 2 times daily 30 tablet 0    simvastatin (ZOCOR) 40 MG tablet Take 20 mg by mouth At Bedtime      TESTOSTERONE 2MG Inject as directed every 14 days      tiZANidine (ZANAFLEX) 4 MG tablet Take 1 tablet (4 mg) by mouth 3 times daily 90 tablet 1    UNABLE TO FIND MEDICATION NAME: Super Enzymes Vitamin      venlafaxine (EFFEXOR) 75 MG tablet Take 75 mg by mouth 3 times daily      VITAMIN D, CHOLECALCIFEROL, PO Take 1,000 Units by mouth daily (Patient not taking: No sig reported)         No Known Allergies    Family History   Problem Relation Age of Onset    Diabetes Mother         Type II    Heart Disease Father         Quad bypass     Social History     Socioeconomic History    Marital status:    Tobacco Use    Smoking status: Former     Current packs/day: 0.00     Types: Cigarettes     Quit date: 2011     Years since quittin.3    Smokeless tobacco: Never   Substance and Sexual Activity    Alcohol use: Yes    Drug use: No    Sexual activity: Yes     Partners: Female     Social Determinants of Health     Interpersonal Safety: Not At Risk (2024)    Received [facility] and [facility]    Intimate Partner Violence     Are you in a relationship where you are physically hurt, threatened and/or made to feel afraid?: No       Additional medical/Social/Surgical histories reviewed in EPIC  and updated as appropriate.     REVIEW OF SYSTEMS (10/7/2024)  10 point ROS of systems including Constitutional, Eyes, Respiratory, Cardiovascular, Gastroenterology, Genitourinary, Integumentary, Musculoskeletal, Psychiatric, Allergic/Immunologic were all negative except for pertinent positives noted in my HPI.     PHYSICAL EXAM  VSS  General  - normal appearance, in no obvious distress  HEENT  - conjunctivae not injected, moist mucous membranes, normocephalic/atraumatic head, ears normal appearance, no lesions, mouth normal appearance, no scars, normal dentition and teeth present  CV  - normal popliteal pulse  Pulm  - normal respiratory pattern, non-labored  Musculoskeletal - left knee  - stance: mildly antalgic gait, genu varum  - inspection: mild generalized swelling, trace effusion  - palpation: medial joint line tenderness, patella and patellar tendon non-tender, normal popliteal pulse  - ROM: 100 degrees flexion, 0 degrees extension, painful active ROM  - strength: 5/5 in flexion, 5/5 in extension  - neuro: no sensory or motor deficit  - special tests:  (-) Lachman  (-) anterior drawer  (-) posterior drawer  (-) pivot shift  (-) Jenny  (-) Thessaly  (-) varus at 0 and 30 degrees flexion  (-) valgus at 0 and 30 degrees flexion  (+) Red s compression test  (-) patellar apprehension  Neuro  - no sensory or motor deficit, grossly normal coordination, normal muscle tone  Skin  - no ecchymosis, erythema, warmth, or induration, no obvious rash  Psych  - interactive, appropriate, normal mood and affect     ASSESSMENT & PLAN  64 yo male with left knee arthritis, worsening again    I independently reviewed the following imaging studies:  Left knee xray: shows arthritis  Discussed has had synvisc one in the past year, and reports improvement of    After a 20 minute discussion and examination, we decided to perform a same day injection for diagnostic and therapeutic purposes for left knee today  Will continue home PT  exercises  And followup in 1-2 months to reconsider further HA injections with synvisc as he overall states that his left knee was helped by the synvisc  Patient has been doing home exercise physical therapy program for this problem      Appropriate PPE was utilized for prevention of spread of Covid-19.  Chace Cottrell MD, [facility]  PROCEDURE:        left     Knee joint injection   The patient was apprised of the risks and the benefits of the procedure and consented and a written consent was signed.   The patient s  KNEE was sterilely prepped with chloraprep.   40 mg of triamcinolone suspension was drawn up into a 5 mL syringe with 4 mL of 1% lidocaine  The patient was injected into the knee with a 1.5-inch 22-gauge needle at the_superiorlateral aspect of their knee joint  There were no complications. The patient tolerated the procedure well. There was minimal bleeding.   The patient was instructed to ice the knee upon leaving clinic and refrain from overuse over the next 3 days.   The patient was instructed to go to the emergency room with any usual pain, swelling, or redness occurred in the injected area.   The patient was given a followup appointment to evaluate response to the injection to their increased range of motion and reduction of pain.  Follow up PRN  Dr Chace Cottrell     Large Joint Injection: L knee joint    Date/Time: 10/7/2024 10:58 AM    Performed by: Chace Cottrell MD  Authorized by: Chace Cottrell MD    Indications:  Pain and osteoarthritis  Needle Size:  22 G  Guidance: landmark guided    Approach:  Superolateral  Location:  Knee      Medications:  40 mg triamcinolone 40 MG/ML; 3 mL lidocaine 1 %  Outcome:  Tolerated well, no immediate complications  Procedure discussed: discussed risks, benefits, and alternatives    Consent Given by:  Patient  Timeout: timeout called immediately prior to procedure    Prep: patient was prepped and draped in usual sterile fashion

## 2024-10-07 NOTE — NURSING NOTE
[address] [address]  Dept: [phone]  ______________________________________________________________________________    Patient: Osbaldo Valerio   : 1960   MRN: [id number]   2024    INVASIVE PROCEDURE SAFETY CHECKLIST    Date: 2024   Procedure: left knee joint injection with kenalog  Patient Name: Osbaldo Valerio  MRN: [id number]  YOB: 1960    Action: Complete sections as appropriate. Any discrepancy results in a HARD COPY until resolved.     PRE PROCEDURE:  Patient ID verified with 2 identifiers (name and  or MRN): Yes  Procedure and site verified with patient/designee (when able): Yes  Accurate consent documentation in medical record: Yes  H&P (or appropriate assessment) documented in medical record: Yes  H&P must be up to 20 days prior to procedure and updates within 24 hours of procedure as applicable: Yes  Relevant diagnostic and radiology test results appropriately labeled and displayed as applicable: NA  Procedure site(s) marked with provider initials: NA    TIMEOUT:  Time-Out performed immediately prior to starting procedure, including verbal and active participation of all team members addressing the following:Yes  * Correct patient identify  * Confirmed that the correct side and site are marked  * An accurate procedure consent form  * Agreement on the procedure to be done  * Correct patient position  * Relevant images and results are properly labeled and appropriately displayed  * The need to administer antibiotics or fluids for irrigation purposes during the procedure as applicable   * Safety precautions based on patient history or medication use    DURING PROCEDURE: Verification of correct person, site, and procedures any time the responsibility for care of the patient is transferred to another member of the care team.       Prior to injection, verified patient identity using  patient's name and date of birth.  Due to injection administration, patient instructed to remain in clinic for 15 minutes  afterwards, and to report any adverse reaction to me immediately.    Joint injection was performed.      Kenalog   Drug Amount Wasted:  None.  Vial/Syringe: Single dose vial  Expiration Date:  06/01/2026    Sherron Arciniega, ATC  October 7, 2024

## 2024-10-07 NOTE — LETTER
10/7/2024      Osbaldo Valerio  [address] [facility] [address]      Dear Colleague,    Thank you for referring your patient, Osbaldo Valerio, to the [facility] [place]. Please see a copy of my visit note below.    HISTORY OF PRESENT ILLNESS  Mr. Valerio is a pleasant 63 year old year old male who presents to clinic today with the following:    What problem are you here for: Follow up for bilateral knee pain/OA. Left knee is worse  And has been treated with BOTH cortisone injections and Synivsc one injections in the past that have provided over 3 months of greater than 50% relief when he has had these in the past  He has pain daily with more walking and squatting and carrying things in his left knee  Has been doing home exercise program for the past year at home without resolution of his pain but does think that it helps.  He would like to get another cortisone injection today and then consider synivsc injection again as he feels like the left knee will benefit from it in the long run  He uses tylenol and ibuprofen regularly to help with the knee pain when it gets more painful and has also used voltaren gel     How long have you had this problem: Chronic     Have you had any recent imaging of this problem? Xrays/MRI/CT scans:  - XR of bilateral     Have you had treatments for this problem in the past?  -Medications: None  -Physical therapy: None   -Injections:  Left Knee Synvisc-One 3/14/2024: he reports this injection provided only 2 months of at least 50% decreased pain in his left knee.   Left knee CSI 1/31/2024: he reports this injection provided 2 months of at least 50% decreased pain.   - Knee Brace: None     How severe is this problem today? 0-10 scale: 4/10    Does this problem or its symptoms cause difficulty for you falling asleep or staying asleep: No       MEDICAL HISTORY  Patient Active Problem List   Diagnosis     Abnormal glucose     Morbid  obesity (H)     Fatigue     Incomplete tear of left rotator cuff, unspecified whether traumatic       Current Outpatient Medications   Medication Sig Dispense Refill     acetaminophen (TYLENOL) 325 MG tablet Take 2 tablets (650 mg) by mouth every 4 hours as needed for mild pain 50 tablet 0     albuterol (PROAIR HFA/PROVENTIL HFA/VENTOLIN HFA) 108 (90 Base) MCG/ACT inhaler INHALE TWO PUFFS BY MOUTH EVERY 4 HOURS AS NEEDED       amLODIPine (NORVASC) 10 MG tablet Take 10 mg by mouth every evening        aspirin 81 MG tablet Take by mouth daily       cyclobenzaprine (FLEXERIL) 10 MG tablet Take 1 tablet (10 mg) by mouth nightly as needed for muscle spasms 30 tablet 1     diazepam (VALIUM) 5 MG tablet Take 1 tablet (5 mg) by mouth once as needed for anxiety Take 30 minutes prior to MRI. May repeat once as needed. (Patient not taking: Reported on 8/19/2022) 2 tablet 0     furosemide (LASIX) 20 MG tablet Take 40 mg by mouth daily       ibuprofen (ADVIL/MOTRIN) 600 MG tablet Take 1 tablet (600 mg) by mouth every 6 hours as needed for moderate pain 40 tablet 0     lidocaine (LIDODERM) 5 % patch Place 1 patch onto the skin every 24 hours To prevent lidocaine toxicity, patient should be patch free for 12 hrs daily. 15 patch 3     lisinopril-hydrochlorothiazide (PRINZIDE,ZESTORETIC) 20-25 MG per tablet Take 1 tablet by mouth daily Holding am of surgery       LORazepam (ATIVAN) 1 MG tablet Take 1-2 tablets (1-2 mg) by mouth once as needed for anxiety (take 1-2 tablets within 30 minutes prior to cervical injection for anxiety) 2 tablet 0     LORazepam (ATIVAN) 1 MG tablet Take 1 tablet (1 mg) by mouth once as needed for anxiety (take 30 minutes prior to MRI) 1 tablet 0     metFORMIN (GLUCOPHAGE) 500 MG tablet Take 500 mg by mouth 2 times daily (with meals) Hold day of surgery       methocarbamol (ROBAXIN) 500 MG tablet Take 1-2 tablets (500-1,000 mg) by mouth nightly as needed for muscle spasms (Patient not taking: Reported on  2022) 60 tablet 0     methocarbamol (ROBAXIN) 500 MG tablet Take 1 tablet (500 mg) by mouth 4 times daily as needed for muscle spasms (Patient not taking: Reported on 2022) 90 tablet 1     Multiple Vitamins-Minerals (MULTIVITAMIN OR) Take 1 tablet by mouth daily       Omega-3 Fatty Acids (OMEGA-3 FISH OIL PO) Take 1 g by mouth daily       oxyCODONE (ROXICODONE) 5 MG tablet Take 1-2 tablets (5-10 mg) by mouth every 4 hours as needed for moderate to severe pain 20 tablet 0     potassium chloride SA (K-DUR,KLOR-CON M) 10 MEQ tablet Take 10 mEq by mouth daily       predniSONE (DELTASONE) 20 MG tablet Take 2 tablets (40 mg) by mouth daily (Patient not taking: No sig reported) 14 tablet 0     predniSONE (DELTASONE) 20 MG tablet Take 2 tablets (40 mg) by mouth daily (Patient not taking: No sig reported) 12 tablet 0     senna-docusate (SENOKOT-S/PERICOLACE) 8.6-50 MG tablet Take 1-2 tablets by mouth 2 times daily 30 tablet 0     simvastatin (ZOCOR) 40 MG tablet Take 20 mg by mouth At Bedtime       TESTOSTERONE 2MG Inject as directed every 14 days       tiZANidine (ZANAFLEX) 4 MG tablet Take 1 tablet (4 mg) by mouth 3 times daily 90 tablet 1     UNABLE TO FIND MEDICATION NAME: Super Enzymes Vitamin       venlafaxine (EFFEXOR) 75 MG tablet Take 75 mg by mouth 3 times daily       VITAMIN D, CHOLECALCIFEROL, PO Take 1,000 Units by mouth daily (Patient not taking: No sig reported)         No Known Allergies    Family History   Problem Relation Age of Onset     Diabetes Mother         Type II     Heart Disease Father         Quad bypass     Social History     Socioeconomic History     Marital status:    Tobacco Use     Smoking status: Former     Current packs/day: 0.00     Types: Cigarettes     Quit date: 2011     Years since quittin.3     Smokeless tobacco: Never   Substance and Sexual Activity     Alcohol use: Yes     Drug use: No     Sexual activity: Yes     Partners: Female     Social Determinants  of Health     Interpersonal Safety: Not At Risk (1/24/2024)    Received from [facility]    Intimate Partner Violence      Are you in a relationship where you are physically hurt, threatened and/or made to feel afraid?: No       Additional medical/Social/Surgical histories reviewed in [facility] and updated as appropriate.     REVIEW OF SYSTEMS (10/7/2024)  10 point ROS of systems including Constitutional, Eyes, Respiratory, Cardiovascular, Gastroenterology, Genitourinary, Integumentary, Musculoskeletal, Psychiatric, Allergic/Immunologic were all negative except for pertinent positives noted in my HPI.     PHYSICAL EXAM  VSS  General  - normal appearance, in no obvious distress  HEENT  - conjunctivae not injected, moist mucous membranes, normocephalic/atraumatic head, ears normal appearance, no lesions, mouth normal appearance, no scars, normal dentition and teeth present  CV  - normal popliteal pulse  Pulm  - normal respiratory pattern, non-labored  Musculoskeletal - left knee  - stance: mildly antalgic gait, genu varum  - inspection: mild generalized swelling, trace effusion  - palpation: medial joint line tenderness, patella and patellar tendon non-tender, normal popliteal pulse  - ROM: 100 degrees flexion, 0 degrees extension, painful active ROM  - strength: 5/5 in flexion, 5/5 in extension  - neuro: no sensory or motor deficit  - special tests:  (-) Lachman  (-) anterior drawer  (-) posterior drawer  (-) pivot shift  (-) Jenny  (-) Thessaly  (-) varus at 0 and 30 degrees flexion  (-) valgus at 0 and 30 degrees flexion  (+) Red s compression test  (-) patellar apprehension  Neuro  - no sensory or motor deficit, grossly normal coordination, normal muscle tone  Skin  - no ecchymosis, erythema, warmth, or induration, no obvious rash  Psych  - interactive, appropriate, normal mood and affect     ASSESSMENT & PLAN  64 yo male with left knee arthritis, worsening again    I independently reviewed  the following imaging studies:  Left knee xray: shows arthritis  Discussed has had synvisc one in the past year, and reports improvement of    After a 20 minute discussion and examination, we decided to perform a same day injection for diagnostic and therapeutic purposes for left knee today  Will continue home PT exercises  And followup in 1-2 months to reconsider further HA injections with synvisc as he overall states that his left knee was helped by the synvisc  Patient has been doing home exercise physical therapy program for this problem      Appropriate PPE was utilized for prevention of spread of Covid-19.  Chace Cottrell MD, CAM  PROCEDURE:        left     Knee joint injection   The patient was apprised of the risks and the benefits of the procedure and consented and a written consent was signed.   The patient s  KNEE was sterilely prepped with chloraprep.   40 mg of triamcinolone suspension was drawn up into a 5 mL syringe with 4 mL of 1% lidocaine  The patient was injected into the knee with a 1.5-inch 22-gauge needle at the_superiorlateral aspect of their knee joint  There were no complications. The patient tolerated the procedure well. There was minimal bleeding.   The patient was instructed to ice the knee upon leaving clinic and refrain from overuse over the next 3 days.   The patient was instructed to go to the emergency room with any usual pain, swelling, or redness occurred in the injected area.   The patient was given a followup appointment to evaluate response to the injection to their increased range of motion and reduction of pain.  Follow up PRN  Dr Chace Cottrell     Large Joint Injection: L knee joint    Date/Time: 10/7/2024 10:58 AM    Performed by: Chace Cottrell MD  Authorized by: Chace Cottrell MD    Indications:  Pain and osteoarthritis  Needle Size:  22 G  Guidance: landmark guided    Approach:  Superolateral  Location:  Knee      Medications:  40 mg triamcinolone 40  MG/ML; 3 mL lidocaine 1 %  Outcome:  Tolerated well, no immediate complications  Procedure discussed: discussed risks, benefits, and alternatives    Consent Given by:  Patient  Timeout: timeout called immediately prior to procedure    Prep: patient was prepped and draped in usual sterile fashion          Again, thank you for allowing me to participate in the care of your patient.        Sincerely,        Chace Cottrell MD

## 2024-11-11 ENCOUNTER — MYC MEDICAL ADVICE (OUTPATIENT)
Dept: ORTHOPEDICS | Facility: CLINIC | Age: 64
End: 2024-11-11
Payer: COMMERCIAL

## 2024-11-12 NOTE — TELEPHONE ENCOUNTER
ATC LVM for patient's wife explaining that Dr. Cottrell had a few cancellations for Wednesday 11/13/2024 either at 8:20am, 8:40am or 4:20pm if he would like to move up his left knee injection from 11/19/2024.     ATC provided call back number of [phone].    It is ok if patient is unable to change appointment, clinic just wanted to offer an earlier appointment.     GEMA Sui

## 2024-11-19 ENCOUNTER — OFFICE VISIT (OUTPATIENT)
Dept: ORTHOPEDICS | Facility: CLINIC | Age: 64
End: 2024-11-19
Payer: COMMERCIAL

## 2024-11-19 DIAGNOSIS — M17.12 ARTHRITIS OF LEFT KNEE: Primary | ICD-10-CM

## 2024-11-19 PROCEDURE — 20610 DRAIN/INJ JOINT/BURSA W/O US: CPT | Mod: LT | Performed by: PREVENTIVE MEDICINE

## 2024-11-19 PROCEDURE — 99207 PR DROP WITH A PROCEDURE: CPT | Performed by: PREVENTIVE MEDICINE

## 2024-11-19 RX ORDER — LIDOCAINE HYDROCHLORIDE 10 MG/ML
3 INJECTION, SOLUTION INFILTRATION; PERINEURAL
Status: COMPLETED | OUTPATIENT
Start: 2024-11-19 | End: 2024-11-19

## 2024-11-19 RX ADMIN — LIDOCAINE HYDROCHLORIDE 3 ML: 10 INJECTION, SOLUTION INFILTRATION; PERINEURAL at 09:18

## 2024-11-19 NOTE — PROGRESS NOTES
Osbaldo returns for left knee injection with synvisc one as planned  Had relief from previous CSI last month  Still having some discomfort  Diagnosis: left knee arthritis  PROCEDURE:         left  Knee joint injection with synvisc one     The patient was apprised of the risks and the benefits of the procedure and consented and a written consent was signed.   The patient s  KNEE was sterilely prepped with chloraprep.     The patient was injected with synvisc one  syringe into the      left       knee with a 1.5-inch 22-gauge needle at the_superiorlateral aspect of their knee joint    There were no complications. The patient tolerated the procedure well. There was minimal bleeding.   The patient was instructed to ice the knee upon leaving clinic and refrain from overuse over the next 3 days.   The patient was instructed to go to the emergency room with any usual pain, swelling, or redness occurred in the injected area.   The patient was given a followup appointment to evaluate response to the injection to their increased range of motion and reduction of pain.  Follow up for euflexxa  Dr Chace Cottrell              Large Joint Injection: L knee joint    Date/Time: 11/19/2024 9:18 AM    Performed by: Chace Cottrell MD  Authorized by: Chace Cottrell MD    Indications:  Pain and osteoarthritis  Needle Size:  22 G  Guidance: landmark guided    Approach:  Superolateral  Location:  Knee      Medications:  48 mg hylan 48 MG/6ML; 3 mL lidocaine 1 %  Outcome:  Tolerated well, no immediate complications  Procedure discussed: discussed risks, benefits, and alternatives    Consent Given by:  Patient  Timeout: timeout called immediately prior to procedure    Prep: patient was prepped and draped in usual sterile fashion

## 2024-11-19 NOTE — NURSING NOTE
[address] [address]  Dept: [phone]  ______________________________________________________________________________    Patient: Osbaldo Valerio   : 1960   MRN: [id number]   2024    INVASIVE PROCEDURE SAFETY CHECKLIST    Date: 2024   Procedure: left knee joint injection with Synvisc-One   Patient Name: Osbaldo Valerio  MRN: [id number]  YOB: 1960    Action: Complete sections as appropriate. Any discrepancy results in a HARD COPY until resolved.     PRE PROCEDURE:  Patient ID verified with 2 identifiers (name and  or MRN): Yes  Procedure and site verified with patient/designee (when able): Yes  Accurate consent documentation in medical record: Yes  H&P (or appropriate assessment) documented in medical record: Yes  H&P must be up to 20 days prior to procedure and updates within 24 hours of procedure as applicable: Yes  Relevant diagnostic and radiology test results appropriately labeled and displayed as applicable: NA  Procedure site(s) marked with provider initials: NA    TIMEOUT:  Time-Out performed immediately prior to starting procedure, including verbal and active participation of all team members addressing the following:Yes  * Correct patient identify  * Confirmed that the correct side and site are marked  * An accurate procedure consent form  * Agreement on the procedure to be done  * Correct patient position  * Relevant images and results are properly labeled and appropriately displayed  * The need to administer antibiotics or fluids for irrigation purposes during the procedure as applicable   * Safety precautions based on patient history or medication use    DURING PROCEDURE: Verification of correct person, site, and procedures any time the responsibility for care of the patient is transferred to another member of the care team.       Prior to injection, verified patient identity  using patient's name and date of birth.  Due to injection administration, patient instructed to remain in clinic for 15 minutes  afterwards, and to report any adverse reaction to me immediately.    Joint injection was performed.      Synvisc-One   Drug Amount Wasted:  None.  Vial/Syringe: Syringe  Expiration Date:  05/31/2027    Sherron Arciniega, ATC  November 19, 2024

## 2024-11-19 NOTE — LETTER
11/19/2024      Osbaldo Valerio  [address] [facility] [address]      Dear Colleague,    Thank you for referring your patient, Osbaldo Valerio, to the [facility] [place]. Please see a copy of my visit note below.    Osbaldo returns for left knee injection with synvisc one as planned  Had relief from previous CSI last month  Still having some discomfort  Diagnosis: left knee arthritis  PROCEDURE:         left  Knee joint injection with synvisc one     The patient was apprised of the risks and the benefits of the procedure and consented and a written consent was signed.   The patient s  KNEE was sterilely prepped with chloraprep.     The patient was injected with synvisc one  syringe into the      left       knee with a 1.5-inch 22-gauge needle at the_superiorlateral aspect of their knee joint    There were no complications. The patient tolerated the procedure well. There was minimal bleeding.   The patient was instructed to ice the knee upon leaving clinic and refrain from overuse over the next 3 days.   The patient was instructed to go to the emergency room with any usual pain, swelling, or redness occurred in the injected area.   The patient was given a followup appointment to evaluate response to the injection to their increased range of motion and reduction of pain.  Follow up for euflexxa  Dr Chace Cottrell              Large Joint Injection: L knee joint    Date/Time: 11/19/2024 9:18 AM    Performed by: Chace Cottrell MD  Authorized by: Chace Cottrell MD    Indications:  Pain and osteoarthritis  Needle Size:  22 G  Guidance: landmark guided    Approach:  Superolateral  Location:  Knee      Medications:  48 mg hylan 48 MG/6ML; 3 mL lidocaine 1 %  Outcome:  Tolerated well, no immediate complications  Procedure discussed: discussed risks, benefits, and alternatives    Consent Given by:  Patient  Timeout: timeout called immediately prior to procedure     Prep: patient was prepped and draped in usual sterile fashion          Again, thank you for allowing me to participate in the care of your patient.        Sincerely,        Chace Cottrell MD

## 2025-07-13 ENCOUNTER — HEALTH MAINTENANCE LETTER (OUTPATIENT)
Age: 65
End: 2025-07-13

## (undated) DEVICE — DRAPE U-POUCH 34X29" 1067

## (undated) DEVICE — SU MONOCRYL 3-0 SH 27" UND Y416H

## (undated) DEVICE — NDL ARTHREX MULTIFIRE/FASTPASS SCORPION AR-13995N

## (undated) DEVICE — SU MONOCRYL 2-0 SH 27" UND Y417H

## (undated) DEVICE — SOL NACL 0.9% IRRIG 3000ML BAG 07972-08

## (undated) DEVICE — PREP CHLORAPREP 26ML TINTED ORANGE  260815

## (undated) DEVICE — SOL WATER IRRIG 1000ML BOTTLE 07139-09

## (undated) DEVICE — STRAP STIRRUP W/SLIP 30187-030

## (undated) DEVICE — PACK SHOULDER ARTHROSCOPY SOP15SAFSH

## (undated) DEVICE — DRAPE IOBAN INCISE 23X17" 6650EZ

## (undated) DEVICE — TUBING SET ARTHREX DUALWAVE OUTFLOW AR-6430

## (undated) DEVICE — IMM KIT SHOULDER TMAX MASK FACE 7210559

## (undated) DEVICE — BUR ARTHREX COOLCUT EXCALIBUR 4.0MMX13CM AR-8400EX

## (undated) DEVICE — GLOVE PROTEXIS POWDER FREE 7.5 ORTHOPEDIC 2D73ET75

## (undated) DEVICE — IMM KIT SHOULDER STABILIZATION 7210573

## (undated) DEVICE — DRAPE MAYO STAND 23X54 8337

## (undated) DEVICE — Device

## (undated) DEVICE — ARTHROSCOPIC CANNULA TWIST-IN PURPLE 7MMX7CM AR-6570

## (undated) DEVICE — DRSG STERI STRIP 1/2X4" R1547

## (undated) DEVICE — DRAPE STERI U 1015

## (undated) DEVICE — DRSG TEGADERM 4X4 3/4" 1626W

## (undated) DEVICE — GLOVE PROTEXIS W/NEU-THERA 7.5  2D73TE75

## (undated) RX ORDER — BUPIVACAINE HYDROCHLORIDE 2.5 MG/ML
INJECTION, SOLUTION EPIDURAL; INFILTRATION; INTRACAUDAL
Status: DISPENSED
Start: 2022-06-02

## (undated) RX ORDER — ACETAMINOPHEN 325 MG/1
TABLET ORAL
Status: DISPENSED
Start: 2022-06-02

## (undated) RX ORDER — FENTANYL CITRATE 50 UG/ML
INJECTION, SOLUTION INTRAMUSCULAR; INTRAVENOUS
Status: DISPENSED
Start: 2022-06-02

## (undated) RX ORDER — ONDANSETRON 2 MG/ML
INJECTION INTRAMUSCULAR; INTRAVENOUS
Status: DISPENSED
Start: 2022-06-02

## (undated) RX ORDER — CEFAZOLIN SODIUM 1 G/3ML
INJECTION, POWDER, FOR SOLUTION INTRAMUSCULAR; INTRAVENOUS
Status: DISPENSED
Start: 2022-06-02

## (undated) RX ORDER — PROPOFOL 10 MG/ML
INJECTION, EMULSION INTRAVENOUS
Status: DISPENSED
Start: 2022-06-02

## (undated) RX ORDER — BUPIVACAINE HYDROCHLORIDE 5 MG/ML
INJECTION, SOLUTION EPIDURAL; INTRACAUDAL
Status: DISPENSED
Start: 2022-06-02

## (undated) RX ORDER — PHENYLEPHRINE HYDROCHLORIDE 10 MG/ML
INJECTION INTRAVENOUS
Status: DISPENSED
Start: 2022-06-02

## (undated) RX ORDER — LIDOCAINE HYDROCHLORIDE 10 MG/ML
INJECTION, SOLUTION EPIDURAL; INFILTRATION; INTRACAUDAL; PERINEURAL
Status: DISPENSED
Start: 2022-06-02